# Patient Record
Sex: FEMALE | Race: WHITE | Employment: PART TIME | URBAN - METROPOLITAN AREA
[De-identification: names, ages, dates, MRNs, and addresses within clinical notes are randomized per-mention and may not be internally consistent; named-entity substitution may affect disease eponyms.]

---

## 2018-05-14 ENCOUNTER — APPOINTMENT (OUTPATIENT)
Dept: CT IMAGING | Age: 48
DRG: 300 | End: 2018-05-14
Attending: EMERGENCY MEDICINE
Payer: COMMERCIAL

## 2018-05-14 ENCOUNTER — APPOINTMENT (OUTPATIENT)
Dept: MRI IMAGING | Age: 48
DRG: 300 | End: 2018-05-14
Attending: EMERGENCY MEDICINE
Payer: COMMERCIAL

## 2018-05-14 ENCOUNTER — APPOINTMENT (OUTPATIENT)
Dept: GENERAL RADIOLOGY | Age: 48
DRG: 300 | End: 2018-05-14
Attending: EMERGENCY MEDICINE
Payer: COMMERCIAL

## 2018-05-14 ENCOUNTER — HOSPITAL ENCOUNTER (INPATIENT)
Age: 48
LOS: 2 days | Discharge: HOME | DRG: 300 | End: 2018-05-16
Attending: EMERGENCY MEDICINE | Admitting: FAMILY MEDICINE
Payer: COMMERCIAL

## 2018-05-14 DIAGNOSIS — G90.2 HORNER'S SYNDROME: Primary | ICD-10-CM

## 2018-05-14 DIAGNOSIS — I77.71 CAROTID ARTERY DISSECTION (HCC): ICD-10-CM

## 2018-05-14 LAB
ALBUMIN SERPL-MCNC: 4.4 G/DL (ref 3.5–5)
ALBUMIN/GLOB SERPL: 1.1 {RATIO} (ref 1.1–2.2)
ALP SERPL-CCNC: 80 U/L (ref 45–117)
ALT SERPL-CCNC: 26 U/L (ref 12–78)
ANION GAP SERPL CALC-SCNC: 9 MMOL/L (ref 5–15)
AST SERPL-CCNC: 16 U/L (ref 15–37)
BASOPHILS # BLD: 0 K/UL (ref 0–0.1)
BASOPHILS NFR BLD: 0 % (ref 0–1)
BILIRUB SERPL-MCNC: 0.3 MG/DL (ref 0.2–1)
BUN SERPL-MCNC: 10 MG/DL (ref 6–20)
BUN/CREAT SERPL: 11 (ref 12–20)
CALCIUM SERPL-MCNC: 8.4 MG/DL (ref 8.5–10.1)
CHLORIDE SERPL-SCNC: 107 MMOL/L (ref 97–108)
CO2 SERPL-SCNC: 26 MMOL/L (ref 21–32)
CREAT SERPL-MCNC: 0.89 MG/DL (ref 0.55–1.02)
DIFFERENTIAL METHOD BLD: ABNORMAL
EOSINOPHIL # BLD: 0.1 K/UL (ref 0–0.4)
EOSINOPHIL NFR BLD: 1 % (ref 0–7)
ERYTHROCYTE [DISTWIDTH] IN BLOOD BY AUTOMATED COUNT: 13.1 % (ref 11.5–14.5)
ERYTHROCYTE [SEDIMENTATION RATE] IN BLOOD: 24 MM/HR (ref 0–20)
GLOBULIN SER CALC-MCNC: 4.1 G/DL (ref 2–4)
GLUCOSE BLD STRIP.AUTO-MCNC: 97 MG/DL (ref 65–100)
GLUCOSE SERPL-MCNC: 111 MG/DL (ref 65–100)
HCT VFR BLD AUTO: 41.1 % (ref 35–47)
HGB BLD-MCNC: 13.2 G/DL (ref 11.5–16)
IMM GRANULOCYTES # BLD: 0.1 K/UL (ref 0–0.04)
IMM GRANULOCYTES NFR BLD AUTO: 1 % (ref 0–0.5)
LYMPHOCYTES # BLD: 2.6 K/UL (ref 0.8–3.5)
LYMPHOCYTES NFR BLD: 28 % (ref 12–49)
MCH RBC QN AUTO: 29.3 PG (ref 26–34)
MCHC RBC AUTO-ENTMCNC: 32.1 G/DL (ref 30–36.5)
MCV RBC AUTO: 91.1 FL (ref 80–99)
MONOCYTES # BLD: 0.4 K/UL (ref 0–1)
MONOCYTES NFR BLD: 5 % (ref 5–13)
NEUTS SEG # BLD: 6.1 K/UL (ref 1.8–8)
NEUTS SEG NFR BLD: 66 % (ref 32–75)
NRBC # BLD: 0 K/UL (ref 0–0.01)
NRBC BLD-RTO: 0 PER 100 WBC
PLATELET # BLD AUTO: 308 K/UL (ref 150–400)
PMV BLD AUTO: 8.9 FL (ref 8.9–12.9)
POTASSIUM SERPL-SCNC: 3.4 MMOL/L (ref 3.5–5.1)
PROT SERPL-MCNC: 8.5 G/DL (ref 6.4–8.2)
RBC # BLD AUTO: 4.51 M/UL (ref 3.8–5.2)
SERVICE CMNT-IMP: NORMAL
SODIUM SERPL-SCNC: 142 MMOL/L (ref 136–145)
TROPONIN I SERPL-MCNC: <0.04 NG/ML
WBC # BLD AUTO: 9.3 K/UL (ref 3.6–11)

## 2018-05-14 PROCEDURE — 74011250636 HC RX REV CODE- 250/636: Performed by: FAMILY MEDICINE

## 2018-05-14 PROCEDURE — 85025 COMPLETE CBC W/AUTO DIFF WBC: CPT | Performed by: EMERGENCY MEDICINE

## 2018-05-14 PROCEDURE — 99283 EMERGENCY DEPT VISIT LOW MDM: CPT

## 2018-05-14 PROCEDURE — 85652 RBC SED RATE AUTOMATED: CPT | Performed by: EMERGENCY MEDICINE

## 2018-05-14 PROCEDURE — 70450 CT HEAD/BRAIN W/O DYE: CPT

## 2018-05-14 PROCEDURE — 36415 COLL VENOUS BLD VENIPUNCTURE: CPT | Performed by: FAMILY MEDICINE

## 2018-05-14 PROCEDURE — 84484 ASSAY OF TROPONIN QUANT: CPT | Performed by: FAMILY MEDICINE

## 2018-05-14 PROCEDURE — 74011250637 HC RX REV CODE- 250/637: Performed by: FAMILY MEDICINE

## 2018-05-14 PROCEDURE — 82962 GLUCOSE BLOOD TEST: CPT

## 2018-05-14 PROCEDURE — 71046 X-RAY EXAM CHEST 2 VIEWS: CPT

## 2018-05-14 PROCEDURE — 74011250636 HC RX REV CODE- 250/636: Performed by: EMERGENCY MEDICINE

## 2018-05-14 PROCEDURE — 80053 COMPREHEN METABOLIC PANEL: CPT | Performed by: EMERGENCY MEDICINE

## 2018-05-14 PROCEDURE — 65660000000 HC RM CCU STEPDOWN

## 2018-05-14 PROCEDURE — 93306 TTE W/DOPPLER COMPLETE: CPT | Performed by: FAMILY MEDICINE

## 2018-05-14 PROCEDURE — 74011000250 HC RX REV CODE- 250: Performed by: PHYSICIAN ASSISTANT

## 2018-05-14 RX ORDER — TETRACAINE HYDROCHLORIDE 5 MG/ML
1 SOLUTION OPHTHALMIC
Status: COMPLETED | OUTPATIENT
Start: 2018-05-14 | End: 2018-05-14

## 2018-05-14 RX ORDER — IBUPROFEN 400 MG/1
600 TABLET ORAL DAILY
COMMUNITY

## 2018-05-14 RX ORDER — ALPRAZOLAM 0.5 MG/1
0.5 TABLET ORAL
Status: DISCONTINUED | OUTPATIENT
Start: 2018-05-14 | End: 2018-05-16 | Stop reason: HOSPADM

## 2018-05-14 RX ORDER — OXYCODONE AND ACETAMINOPHEN 5; 325 MG/1; MG/1
1 TABLET ORAL
Status: DISCONTINUED | OUTPATIENT
Start: 2018-05-14 | End: 2018-05-16 | Stop reason: HOSPADM

## 2018-05-14 RX ORDER — SERTRALINE HYDROCHLORIDE 100 MG/1
100 TABLET, FILM COATED ORAL DAILY
COMMUNITY

## 2018-05-14 RX ORDER — LEVOTHYROXINE SODIUM 150 UG/1
150 TABLET ORAL
Status: DISCONTINUED | OUTPATIENT
Start: 2018-05-15 | End: 2018-05-16 | Stop reason: HOSPADM

## 2018-05-14 RX ORDER — SODIUM CHLORIDE 0.9 % (FLUSH) 0.9 %
5-10 SYRINGE (ML) INJECTION AS NEEDED
Status: DISCONTINUED | OUTPATIENT
Start: 2018-05-14 | End: 2018-05-16 | Stop reason: HOSPADM

## 2018-05-14 RX ORDER — ACETAMINOPHEN 650 MG/1
650 SUPPOSITORY RECTAL
Status: DISCONTINUED | OUTPATIENT
Start: 2018-05-14 | End: 2018-05-16 | Stop reason: HOSPADM

## 2018-05-14 RX ORDER — SODIUM CHLORIDE 9 MG/ML
75 INJECTION, SOLUTION INTRAVENOUS CONTINUOUS
Status: DISPENSED | OUTPATIENT
Start: 2018-05-14 | End: 2018-05-15

## 2018-05-14 RX ORDER — GUAIFENESIN 100 MG/5ML
81 LIQUID (ML) ORAL DAILY
Status: DISCONTINUED | OUTPATIENT
Start: 2018-05-15 | End: 2018-05-15

## 2018-05-14 RX ORDER — ATORVASTATIN CALCIUM 10 MG/1
10 TABLET, FILM COATED ORAL DAILY
Status: DISCONTINUED | OUTPATIENT
Start: 2018-05-15 | End: 2018-05-16 | Stop reason: HOSPADM

## 2018-05-14 RX ORDER — LEVOTHYROXINE SODIUM 150 UG/1
300 TABLET ORAL
COMMUNITY

## 2018-05-14 RX ORDER — ACETAMINOPHEN 325 MG/1
650 TABLET ORAL
Status: ACTIVE | OUTPATIENT
Start: 2018-05-14 | End: 2018-05-15

## 2018-05-14 RX ORDER — FAMOTIDINE 20 MG/1
20 TABLET, FILM COATED ORAL EVERY 12 HOURS
Status: DISCONTINUED | OUTPATIENT
Start: 2018-05-14 | End: 2018-05-16 | Stop reason: HOSPADM

## 2018-05-14 RX ORDER — SODIUM CHLORIDE 0.9 % (FLUSH) 0.9 %
5-10 SYRINGE (ML) INJECTION EVERY 8 HOURS
Status: DISCONTINUED | OUTPATIENT
Start: 2018-05-14 | End: 2018-05-16 | Stop reason: HOSPADM

## 2018-05-14 RX ORDER — SERTRALINE HYDROCHLORIDE 50 MG/1
100 TABLET, FILM COATED ORAL DAILY
Status: DISCONTINUED | OUTPATIENT
Start: 2018-05-15 | End: 2018-05-16 | Stop reason: HOSPADM

## 2018-05-14 RX ORDER — ATORVASTATIN CALCIUM 10 MG/1
TABLET, FILM COATED ORAL DAILY
COMMUNITY

## 2018-05-14 RX ORDER — ALPRAZOLAM 0.5 MG/1
TABLET ORAL
COMMUNITY

## 2018-05-14 RX ORDER — ACETAMINOPHEN 325 MG/1
650 TABLET ORAL
Status: DISCONTINUED | OUTPATIENT
Start: 2018-05-14 | End: 2018-05-16 | Stop reason: HOSPADM

## 2018-05-14 RX ORDER — POTASSIUM CHLORIDE 750 MG/1
10 TABLET, FILM COATED, EXTENDED RELEASE ORAL
Status: COMPLETED | OUTPATIENT
Start: 2018-05-14 | End: 2018-05-14

## 2018-05-14 RX ADMIN — FLUORESCEIN SODIUM 1 STRIP: 1 STRIP OPHTHALMIC at 12:26

## 2018-05-14 RX ADMIN — SODIUM CHLORIDE 1000 ML: 900 INJECTION, SOLUTION INTRAVENOUS at 13:18

## 2018-05-14 RX ADMIN — FAMOTIDINE 20 MG: 20 TABLET ORAL at 21:21

## 2018-05-14 RX ADMIN — OXYCODONE HYDROCHLORIDE AND ACETAMINOPHEN 1 TABLET: 5; 325 TABLET ORAL at 18:13

## 2018-05-14 RX ADMIN — TETRACAINE HYDROCHLORIDE 1 DROP: 5 SOLUTION OPHTHALMIC at 12:25

## 2018-05-14 RX ADMIN — SODIUM CHLORIDE 75 ML/HR: 900 INJECTION, SOLUTION INTRAVENOUS at 21:20

## 2018-05-14 RX ADMIN — POTASSIUM CHLORIDE 10 MEQ: 750 TABLET, EXTENDED RELEASE ORAL at 18:13

## 2018-05-14 RX ADMIN — OXYCODONE HYDROCHLORIDE AND ACETAMINOPHEN 1 TABLET: 5; 325 TABLET ORAL at 22:27

## 2018-05-14 RX ADMIN — Medication 10 ML: at 21:22

## 2018-05-14 NOTE — IP AVS SNAPSHOT
[facility] [address] 
[phone] Patient: Jt Ibarra MRN: [id number] Jae La About your hospitalization You were admitted on:  May 14, 2018 You last received care in the:  [address] [place] [address] NEURO-SCI TELE You were discharged on:  May 16, 2018 Why you were hospitalized Your primary diagnosis was:  Carotid Artery Dissection (Hcc) Your diagnoses also included:  Lorrie Syndrome Follow-up Information Follow up With Details Comments Contact Info Angel Lazo MD On 5/23/2018 Hospital PCP f/u appointment on Wednesday May 23 @ 2:30 p.m. [facility] [address] [place] [address] 
[phone] Randa Daniels MD In 1 month For follow up and repeat  [facility] [address] [place] [address] 
[phone] Discharge Orders None A check ady indicates which time of day the medication should be taken. My Medications START taking these medications Instructions Each Dose to Equal  
 Morning Noon Evening Bedtime  
 apixaban 5 mg tablet Commonly known as:  Pattricia Boom Your last dose was: Your next dose is: Take 1 Tab by mouth two (2) times a day. Indications: Cerebral Thromboembolism Prevention 5 mg  
    
   
   
   
  
 aspirin 81 mg chewable tablet Start taking on:  5/17/2018 Your last dose was: Your next dose is: Take 1 Tab by mouth daily. 81 mg  
    
   
   
   
  
 famotidine 20 mg tablet Commonly known as:  PEPCID Your last dose was: Your next dose is: Take 1 Tab by mouth every twelve (12) hours. 20 mg  
    
   
   
   
  
 oxyCODONE-acetaminophen 5-325 mg per tablet Commonly known as:  PERCOCET Your last dose was: Your next dose is: Take 1 Tab by mouth every eight (8) hours as needed. Max Daily Amount: 3 Tabs. 1 Tab CONTINUE taking these medications Instructions Each Dose to Equal  
 Morning Noon Evening Bedtime ALPRAZolam 0.5 mg tablet Commonly known as:  Nielstip Negrete Your last dose was: Your next dose is: Take  by mouth two (2) times daily as needed for Anxiety. ibuprofen 400 mg tablet Commonly known as:  MOTRIN Your last dose was: Your next dose is: Take 400 mg by mouth daily. 400 mg  
    
   
   
   
  
 LIPITOR 10 mg tablet Generic drug:  atorvastatin Your last dose was: Your next dose is: Take  by mouth daily. sertraline 100 mg tablet Commonly known as:  ZOLOFT Your last dose was: Your next dose is: Take 100 mg by mouth daily. 100 mg UNITHROID 150 mcg tablet Generic drug:  levothyroxine Your last dose was: Your next dose is: Take  by mouth Daily (before breakfast). Where to Get Your Medications Information on where to get these meds will be given to you by the nurse or doctor. ! Ask your nurse or doctor about these medications  
  apixaban 5 mg tablet  
 aspirin 81 mg chewable tablet  
 famotidine 20 mg tablet  
 oxyCODONE-acetaminophen 5-325 mg per tablet Opioid Education Prescription Opioids: What You Need to Know: 
 
 
ATTENDING PHYSICIAN: Lula Dejesus MD 
DISCHARGING PROVIDER: Lula Dejesus MD   
 To contact this individual call [phone] and ask the  to page. If unavailable ask to be transferred the Adult Hospitalist Department. DISCHARGE DIAGNOSES #. 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 Right carotid artery dissection #. HLD: on statin #. Hypokalemia, replaced CONSULTATIONS: IP CONSULT TO OPHTHALMOLOGY 
IP CONSULT TO NEUROLOGY 
IP CONSULT TO NEUROINTERVENTIONAL SURGERY 
IP CONSULT TO HOSPITALIST 
 
PROCEDURES/SURGERIES: * No surgery found * PENDING TEST RESULTS:  
At the time of discharge the following test results are still pending: none FOLLOW UP APPOINTMENTS:  
Follow-up Information Follow up With Details Comments Contact Info Gladys Duran MD   [facility] [address] [place] [address] 
[phone] ADDITIONAL CARE RECOMMENDATIONS:  Follow up with interventional neurologist Dr Forest Deleon in 1 month DIET: Regular Diet ACTIVITY: No Strenuous exercise, No heavy lifting, pushing, pulling DISCHARGE MEDICATIONS: 
 See Medication Reconciliation Form · It is important that you take the medication exactly as they are prescribed. · Keep your medication in the bottles provided by the pharmacist and keep a list of the medication names, dosages, and times to be taken in your wallet. · Do not take other medications without consulting your doctor. NOTIFY YOUR PHYSICIAN FOR ANY OF THE FOLLOWING:  
Fever over 101 degrees for 24 hours. Chest pain, shortness of breath, fever, chills, nausea, vomiting, diarrhea, change in mentation, falling, weakness, bleeding. Severe pain or pain not relieved by medications. Or, any other signs or symptoms that you may have questions about. DISPOSITION: 
 x Home With: 
 OT  PT  [facility]  RN  
  
 SNF/Inpatient Rehab/LTAC Independent/assisted living Hospice Other:  
 
 
 
Signed:  
Corine Ch MD 
5/16/2018 
2:04 PM 
 
Apixaban (By mouth) Apixaban (a-PIX-a-ban) Treats and prevents blood clots. This medicine is a blood thinner. Brand Name(s): Eliquis There may be other brand names for this medicine. When This Medicine Should Not Be Used: This medicine is not right for everyone. Do not use it if you had an allergic reaction to apixaban or you have active bleeding. How to Use This Medicine:  
Tablet · Your doctor will tell you how much medicine to use. Do not use more than directed. · If you are not able to swallow the tablets whole, they may be crushed and mixed in water, 5% dextrose in water (D5W), apple juice, or applesauce. The crushed tablets may be mixed with 60 mL of water or D5W dose and given through a nasogastric tube (NGT). · This medicine should come with a Medication Guide. Ask your pharmacist for a copy if you do not have one. · Missed dose: Take a dose as soon as you remember. If it is almost time for your next dose, wait until then and take a regular dose. Do not take extra medicine to make up for a missed dose. · Store the medicine in a closed container at room temperature, away from heat, moisture, and direct light. Drugs and Foods to Avoid: Ask your doctor or pharmacist before using any other medicine, including over-the-counter medicines, vitamins, and herbal products. · Some medicines can affect how apixaban works. Tell your doctor if you are using any of the following: ¨ Carbamazepine, clarithromycin, itraconazole, ketoconazole, phenytoin, rifampin, ritonavir, Aide's wort ¨ Blood thinner (including clopidogrel, heparin, prasugrel, warfarin) ¨ Medicine to treat depression ¨ NSAID pain or arthritis medicine (including aspirin, celecoxib, diclofenac, ibuprofen, naproxen) Warnings While Using This Medicine: · Tell your doctor if you are pregnant or breastfeeding, or if you have kidney disease, liver disease, bleeding problems, or an artificial heart valve. · Do not stop using this medicine suddenly without asking your doctor.  You might have a higher risk of stroke for a short time after you stop using this medicine. · This medicine increases your risk for bleeding that can become serious if not controlled. You may also bruise easily, and it may take longer than usual for bleeding to stop. · This medicine may increase your risk for blood clots in your spine or back if you undergo an epidural or spinal puncture. This could lead to paralysis. Tell your doctor if you ever had spine problems or back surgery. · Tell any doctor or dentist who treats you that you are using this medicine. With your doctor's supervision, you may need to stop using this medicine several days before you have surgery or medical tests. · Your doctor will do lab tests at regular visits to check on the effects of this medicine. Keep all appointments. · Keep all medicine out of the reach of children. Never share your medicine with anyone. Possible Side Effects While Using This Medicine:  
Call your doctor right away if you notice any of these side effects: · Allergic reaction: Itching or hives, swelling in your face or hands, swelling or tingling in your mouth or throat, chest tightness, trouble breathing · Change in how much or how often you urinate, red or pink urine · Chest pain, trouble breathing · Coughing up blood, vomiting blood or material that looks like coffee grounds · Numbness, tingling, or muscle weakness in your legs or feet · Red or black, tarry stools · Unusual bleeding, bruising, or weakness If you notice other side effects that you think are caused by this medicine, tell your doctor. Call your doctor for medical advice about side effects. You may report side effects to FDA at 2-657-FDA-8005 © 2017 [address] Information is for End User's use only and may not be sold, redistributed or otherwise used for commercial purposes. The above information is an  only.  It is not intended as medical advice for individual conditions or treatments. Talk to your doctor, nurse or pharmacist before following any medical regimen to see if it is safe and effective for you. Introducing [facility]! [employer] introduces Inquirly patient portal. Now you can access parts of your medical record, email your doctor's office, and request medication refills online. 1. In your internet browser, go to [email or web address] 2. Click on the First Time User? Click Here link in the Sign In box. You will see the New Member Sign Up page. 3. Enter your Inquirly Access Code exactly as it appears below. You will not need to use this code after youve completed the sign-up process. If you do not sign up before the expiration date, you must request a new code. · Inquirly Access Code: [email or web address] Expires: 8/12/2018 11:56 AM 
 
4. Enter the last four digits of your Social Security Number (xxxx) and Date of Birth (mm/dd/yyyy) as indicated and click Submit. You will be taken to the next sign-up page. 5. Create a Inquirly ID. This will be your Inquirly login ID and cannot be changed, so think of one that is secure and easy to remember. 6. Create a Inquirly password. You can change your password at any time. 7. Enter your Password Reset Question and Answer. This can be used at a later time if you forget your password. 8. Enter your e-mail address. You will receive e-mail notification when new information is available in [address] 9. Click Sign Up. You can now view and download portions of your medical record. 10. Click the Download Summary menu link to download a portable copy of your medical information. If you have questions, please visit the Frequently Asked Questions section of the Inquirly website. Remember, Inquirly is NOT to be used for urgent needs. For medical emergencies, dial 911. Now available from your iPhone and Android! Introducing Temo Mcnulty As a [facility] patient, I wanted to make you aware of our electronic visit tool called Temo ArtQuesCom. [facility] 24/7 allows you to connect within minutes with a medical provider 24 hours a day, seven days a week via a mobile device or tablet or logging into a secure website from your computer. You can access Weekdone from anywhere in the [place]. A virtual visit might be right for you when you have a simple condition and feel like you just dont want to get out of bed, or cant get away from work for an appointment, when your regular [facility] provider is not available (evenings, weekends or holidays), or when youre out of town and need minor care. Electronic visits cost only $49 and if the [employer] 24/7 provider determines a prescription is needed to treat your condition, one can be electronically transmitted to a nearby pharmacy*. Please take a moment to enroll today if you have not already done so. The enrollment process is free and takes just a few minutes. To enroll, please download the [facility] 24/7 julia to your tablet or phone, or visit [email or web address] to enroll on your computer. And, as an [address] [place] [address] patient with a [email or web address] account, the results of your visits will be scanned into your electronic medical record and your primary care provider will be able to view the scanned results. We urge you to continue to see your regular [employer] provider for your ongoing medical care. And while your primary care provider may not be the one available when you seek a Temo Mcnulty virtual visit, the peace of mind you get from getting a real diagnosis real time can be priceless. For more information on Temo Treehousechapinfin, view our Frequently Asked Questions (FAQs) at [email or web address]. Sincerely, 
 
Jeovanny Brown MD 
Chief Medical Officer [employer] *:  certain medications cannot be prescribed via Temo Mcnulty Providers Seen During Your Hospitalization Provider Specialty Primary office phone Saulo Villanueva MD Emergency Medicine [phone] Fadumo Aguirre MD Hospitalist [phone] Dora Harrison MD Internal Medicine [phone] Your Primary Care Physician (PCP) Primary Care Physician Office Phone Office Fax  
 Louise torres [phone] You are allergic to the following Allergen Reactions Pcn (Penicillins) Swelling 5/14/18: Swelling, itching (childhood) Recent Documentation Height Weight BMI OB Status Smoking Status 1.676 m 114.5 kg 40.75 kg/m2 IUD Former Smoker Emergency Contacts Name Discharge Info Relation Home Work Mobile [employer] DISCHARGE CAREGIVER [3] Spouse [3]   [phone] Patient Belongings The following personal items are in your possession at time of discharge: 
  Dental Appliances: None  Visual Aid: None      Home Medications: None   Jewelry: Watch, Ring  Clothing: Pajamas    Other Valuables: Cell Phone Please provide this summary of care documentation to your next provider. Signatures-by signing, you are acknowledging that this After Visit Summary has been reviewed with you and you have received a copy. Patient Signature:  ____________________________________________________________ Date:  ____________________________________________________________  
  
Dee Sams Provider Signature:  ____________________________________________________________ Date:  ____________________________________________________________

## 2018-05-14 NOTE — PROGRESS NOTES
Problem: Falls - Risk of  Goal: *Absence of Falls  Document Alfredo Fall Risk and appropriate interventions in the flowsheet.    Outcome: Progressing Towards Goal  Fall Risk Interventions:            Medication Interventions: Evaluate medications/consider consulting pharmacy, Patient to call before getting OOB, Teach patient to arise slowly, Assess postural VS orthostatic hypotension

## 2018-05-14 NOTE — PROGRESS NOTES
Admission Medication Reconciliation:    Information obtained from: Patient, RX Query    Significant PMH/Disease States:   Past Medical History:   Diagnosis Date    Elevated cholesterol     Psychiatric disorder     depression, anxiety       Chief Complaint for this Admission:  Eye problem    Allergies:  Pcn [penicillins]    Prior to Admission Medications:   Prior to Admission Medications   Prescriptions Last Dose Informant Patient Reported? Taking? ALPRAZolam (XANAX) 0.5 mg tablet 5/13/2018 at Unknown time  Yes Yes   Sig: Take  by mouth two (2) times daily as needed for Anxiety. atorvastatin (LIPITOR) 10 mg tablet 5/13/2018 at Unknown time  Yes Yes   Sig: Take  by mouth daily. ibuprofen (MOTRIN) 400 mg tablet 5/13/2018 at Unknown time  Yes Yes   Sig: Take 400 mg by mouth daily. levothyroxine (UNITHROID) 150 mcg tablet 5/13/2018 at Unknown time  Yes Yes   Sig: Take  by mouth Daily (before breakfast). sertraline (ZOLOFT) 100 mg tablet 5/13/2018 at Unknown time  Yes Yes   Sig: Take 100 mg by mouth daily. Facility-Administered Medications: None         Comments/Recommendations: Patient provided information, allergies were confirmed (updated to include \"itching\"). Added all agents. States she takes no other medications (prescription or OTC) other than stated here. Thank you for allowing me to participate in the care of your patient.     Lady Hammond PharmD, RN #[id number]

## 2018-05-14 NOTE — IP AVS SNAPSHOT
[address] [place] [address] 
[phone] Patient: Remi Kirby MRN: [id number] [facility] A check ady indicates which time of day the medication should be taken. My Medications START taking these medications Instructions Each Dose to Equal  
 Morning Noon Evening Bedtime  
 apixaban 5 mg tablet Commonly known as:  Iban Wright Your last dose was: Your next dose is: Take 1 Tab by mouth two (2) times a day. Indications: Cerebral Thromboembolism Prevention 5 mg  
    
   
   
   
  
 aspirin 81 mg chewable tablet Start taking on:  5/17/2018 Your last dose was: Your next dose is: Take 1 Tab by mouth daily. 81 mg  
    
   
   
   
  
 famotidine 20 mg tablet Commonly known as:  PEPCID Your last dose was: Your next dose is: Take 1 Tab by mouth every twelve (12) hours. 20 mg  
    
   
   
   
  
 oxyCODONE-acetaminophen 5-325 mg per tablet Commonly known as:  PERCOCET Your last dose was: Your next dose is: Take 1 Tab by mouth every eight (8) hours as needed. Max Daily Amount: 3 Tabs. 1 Tab CONTINUE taking these medications Instructions Each Dose to Equal  
 Morning Noon Evening Bedtime ALPRAZolam 0.5 mg tablet Commonly known as:  Faisal Vera Your last dose was: Your next dose is: Take  by mouth two (2) times daily as needed for Anxiety. ibuprofen 400 mg tablet Commonly known as:  MOTRIN Your last dose was: Your next dose is: Take 400 mg by mouth daily. 400 mg  
    
   
   
   
  
 LIPITOR 10 mg tablet Generic drug:  atorvastatin Your last dose was: Your next dose is: Take  by mouth daily. sertraline 100 mg tablet Commonly known as:  ZOLOFT  
 Your last dose was: Your next dose is: Take 100 mg by mouth daily. 100 mg UNITHROID 150 mcg tablet Generic drug:  levothyroxine Your last dose was: Your next dose is: Take  by mouth Daily (before breakfast). Where to Get Your Medications Information on where to get these meds will be given to you by the nurse or doctor. ! Ask your nurse or doctor about these medications  
  apixaban 5 mg tablet  
 aspirin 81 mg chewable tablet  
 famotidine 20 mg tablet  
 oxyCODONE-acetaminophen 5-325 mg per tablet

## 2018-05-14 NOTE — ROUTINE PROCESS
TRANSFER - OUT REPORT:    Verbal report given to Ozzy Templeton RN(name) on [address]  being transferred to [facility](unit) for routine progression of care       Report consisted of patients Situation, Background, Assessment and   Recommendations(SBAR). Information from the following report(s) SBAR, ED Summary, Procedure Summary, Intake/Output, MAR and Recent Results was reviewed with the receiving nurse. Lines:       Opportunity for questions and clarification was provided.       Patient transported with:

## 2018-05-14 NOTE — ED NOTES
11:59 AM  I have evaluated the patient as the Provider in Triage. I have reviewed Her vital signs and the triage nurse assessment. I have talked with the patient and any available family and advised that I am the provider in triage and have ordered the appropriate study to initiate their work up based on the clinical presentation during my assessment. I have advised that the patient will be accommodated in the Main ED as soon as possible. I have also requested to contact the triage nurse or myself immediately if the patient experiences any changes in their condition during this brief waiting period. Pt with right eye pain, photophobia. + pain with consensual pupil response. + conjunctival irritation.     GRACIELA Martinez

## 2018-05-14 NOTE — ED PROVIDER NOTES
HPI Comments: 41 y.o. female with past medical history significant for elevated cholesterol who presents, accompanied by , with chief complaint of eye problem. Patient reports waking up this morning and noticing her right eyelid was drooping. Patient also complains of photophobia and pain behind her right eye. Patient's family members noticed her pupils were unequal this morning. Patient has not recently used scopolamine patch. Patient also complains of constant nausea for the past 5 days following a 24-hour period of vomiting and diarrhea. Patient states diarrhea is still constant and she has lack of appetite due to nausea an diarrhea every time she eats. Patient denies eye pain, blurry vision, foreign body sensation, eye irritation, dizziness, gait disturbance, weakness, numbness. There are no other acute medical concerns at this time. Social hx: Former smoker,   PCP: No primary care provider on file. Note written by Shawn Kahn. Michelle Lambert, as dictated by Justin Brito MD 12:34 PM      The history is provided by the patient and the spouse. Past Medical History:   Diagnosis Date    Elevated cholesterol     Psychiatric disorder     depression, anxiety       Past Surgical History:   Procedure Laterality Date    HX CHOLECYSTECTOMY      HX THYROIDECTOMY           History reviewed. No pertinent family history. Social History     Social History    Marital status: N/A     Spouse name: N/A    Number of children: N/A    Years of education: N/A     Occupational History    Not on file. Social History Main Topics    Smoking status: Former Smoker    Smokeless tobacco: Never Used    Alcohol use No    Drug use: No    Sexual activity: Not on file     Other Topics Concern    Not on file     Social History Narrative    No narrative on file         ALLERGIES: Pcn [penicillins]    Review of Systems   Constitutional: Positive for appetite change. Negative for chills and fever. HENT: Negative for rhinorrhea, sore throat and trouble swallowing. Eyes: Positive for photophobia. Negative for pain, redness and visual disturbance. Respiratory: Negative for cough and shortness of breath. Cardiovascular: Negative for chest pain and palpitations. Gastrointestinal: Positive for diarrhea and nausea. Negative for abdominal pain and vomiting. Genitourinary: Negative for dysuria, frequency and hematuria. Musculoskeletal: Negative for arthralgias. Neurological: Positive for headaches. Negative for dizziness, syncope, weakness and numbness. Psychiatric/Behavioral: Negative for behavioral problems. The patient is not nervous/anxious. All other systems reviewed and are negative. Vitals:    05/14/18 1159   BP: (!) 142/103   Pulse: 90   Resp: 18   Temp: 98.1 °F (36.7 °C)   SpO2: 97%   Weight: 113.3 kg (249 lb 12.8 oz)            Physical Exam   Constitutional: She appears well-developed and well-nourished. HENT:   Head: Normocephalic and atraumatic. Mouth/Throat: Oropharynx is clear and moist.   Eyes: EOM are normal.   Ptosis of right eye. Right pupil 3 mm. Left pupil 6 mm. Neck: Normal range of motion. Neck supple. Cardiovascular: Normal rate, regular rhythm, normal heart sounds and intact distal pulses. Exam reveals no gallop and no friction rub. No murmur heard. Pulmonary/Chest: Effort normal. No respiratory distress. She has no wheezes. She has no rales. Abdominal: Soft. There is no tenderness. There is no rebound. Musculoskeletal: Normal range of motion. She exhibits no tenderness. Neurological: She is alert. No cranial nerve deficit. Motor; symmetric   Skin: No erythema. Psychiatric: She has a normal mood and affect. Her behavior is normal.   Nursing note and vitals reviewed. Note written by Marco A Mendoza.  Abhi Liang, as dictated by Mike Garber MD 12:38 PM       [facility]      ED Course       Procedures         CONSULT NOTE:  Spoke to Dr Nguyen Callahan concerning the patient. The patient's history, presentation, physical findings, and results were all discussed. 3:09 PM      CONSULT NOTE:  3:39 PM Kimi Garcia MD spoke with Dr. Andi Singleton, Consult for Neurology. Discussed available diagnostic tests and clinical findings. Dr. Andi Singleton agrees with admission and recommends MRI of brain and MRA of head and neck. She will see patient in consult. 3:42 PM  Dr. Filipe Matthews will admit patient.

## 2018-05-14 NOTE — ED TRIAGE NOTES
Pt complains of R eye swelling and noted R pupil different size then the L. Also complains of headache. Denies altered vision. Complains of light sensitivity.

## 2018-05-14 NOTE — H&P
[address]   HISTORY AND PHYSICAL      Nate Flores  MR#: [id number]  : 1970  ACCOUNT #: [de-identified]   ADMIT DATE: 2018    CHIEF COMPLAINT:  Ptosis. HISTORY OF PRESENT ILLNESS:  The patient is a 72-year-old female with past medical history of migraines and hyperlipidemia who presents to the hospital with the above-mentioned symptoms. The patient reports that she was doing well until yesterday, woke up this morning and noticed that her right eyelid was \"drooping\". Patient reports that she also has some photophobia just on the right eye and pain behind her right eye. Patient reports that her family members noticed her pupils were unequal this morning and she is absolutely sure that they were absolutely fine yesterday because she looked at them in the mirror. Patient reports that about a week back, she started having some nausea and then had diarrhea for three days. The patient reports that her diarrhea and nausea/vomiting has resolved, but her appetite has not gained back as, \"it should\". The patient was seen in the ER and was requested to be admitted under the hospitalist service. The patient denies any trauma to the eye. Denies any balance issues. Denies any headache, blurry vision, sore throat, trouble swallowing, trouble with speech. Denies any chest pain, shortness of breath, cough, fever, chills, abdominal pain, constipation, diarrhea, urinary symptoms, focal or generalized neurological weakness, recent travels, sick contacts, any falls, injuries, hematemesis, melena, hemoptysis or any other concerns or problems. PAST MEDICAL HISTORY:  See above. HOME MEDICATIONS:  Xanax 0.5 mg b.i.d. as needed, Synthroid 150 mcg daily, Zoloft 100 mg daily, Lipitor 10 mg daily, ibuprofen 400 mg. SOCIAL HISTORY:  Former smoker, no alcohol, no IV drug abuse. ALLERGIES:  PENICILLIN.     REVIEW OF SYSTEMS:  All systems were reviewed and were found to be essentially negative except for the symptoms mentioned above. FAMILY HISTORY:  Was discussed, was found to be noncontributory. PHYSICAL EXAMINATION:  VITAL SIGNS:  Temperature 98.1, pulse 85, respiration rate 18, blood pressure 145/89, pulse ox and 98% on room air. GENERAL:  Alert x2, awake, no acute distress, resting in bed, pleasant female, appears to be stated age. HEENT:  Right pupil is about 1.5-2 mm in diameter and mildly sluggish in response. The left pupil is normal size, equal and reactive. The right eye shows ptosis. Tympanic membranes clear. Moist mucous membranes. NECK:  Supple. No meningeal signs. CHEST:  Clear to auscultation bilaterally. HEART:  S1, S2 were heard. ABDOMEN:  Soft, nontender, nondistended. Bowel sounds are physiologic. EXTREMITIES:  No clubbing, no cyanosis, no edema. NEUROPSYCHIATRIC:  Pleasant mood and affect. Cranial nerves II-XII grossly intact except for ptosis of the right eye as well as constricted pupil. Moves all 4. Strength 5 x 5. Sensory grossly within normal limits. SKIN:  Warm. LABORATORY DATA:  White count 9.3, hemoglobin 13.2, hematocrit 41.1, platelets 407. Sodium 142, potassium 3.4, chloride 107, bicarbonate 26, anion gap 9, glucose 111, BUN 10, creatinine 0.89, calcium 8.4, bilirubin total 0.3, ALT 26, AST 16, alkaline phosphatase 80. CT of the head shows no acute intracranial abnormality. X-ray of the chest is pending. ASSESSMENT AND PLAN:  1. 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 The patient will be admitted on neuro-tele bed. Neurology has been consulted. MRI of the brain and neck has been ordered. We will provide gentle IV hydration, neurovascular checks. We will get a chest x-ray and may consider getting a CT of the chest to rule out any occult malignancies. We will provide supportive care, pain control and continue to monitor.   I will put the patient on fall precautions and may consider further intervention and diagnostics in discussion Neurology. We will reassess as needed. Continue to monitor. 2.  History of hyperlipidemia. Continue home medication. 3.  Hypokalemia. Potassium was replaced. 4.  GI and deep venous thrombosis prophylaxis. The patient will be on SCDs.       Rossy Cota MD MM/MN  D: 05/14/2018 16:53     T: 05/14/2018 17:21  JOB #: [id number]

## 2018-05-15 ENCOUNTER — APPOINTMENT (OUTPATIENT)
Dept: MRI IMAGING | Age: 48
DRG: 300 | End: 2018-05-15
Attending: EMERGENCY MEDICINE
Payer: COMMERCIAL

## 2018-05-15 ENCOUNTER — APPOINTMENT (OUTPATIENT)
Dept: CT IMAGING | Age: 48
DRG: 300 | End: 2018-05-15
Attending: RADIOLOGY
Payer: COMMERCIAL

## 2018-05-15 PROBLEM — I77.71 CAROTID ARTERY DISSECTION (HCC): Status: ACTIVE | Noted: 2018-05-15

## 2018-05-15 LAB
CHOLEST SERPL-MCNC: 180 MG/DL
ERYTHROCYTE [DISTWIDTH] IN BLOOD BY AUTOMATED COUNT: 13.2 % (ref 11.5–14.5)
EST. AVERAGE GLUCOSE BLD GHB EST-MCNC: 117 MG/DL
HBA1C MFR BLD: 5.7 % (ref 4.2–6.3)
HCT VFR BLD AUTO: 39.1 % (ref 35–47)
HDLC SERPL-MCNC: 39 MG/DL
HDLC SERPL: 4.6 {RATIO} (ref 0–5)
HGB BLD-MCNC: 12.5 G/DL (ref 11.5–16)
LDLC SERPL CALC-MCNC: 112 MG/DL (ref 0–100)
LIPID PROFILE,FLP: ABNORMAL
MCH RBC QN AUTO: 29.3 PG (ref 26–34)
MCHC RBC AUTO-ENTMCNC: 32 G/DL (ref 30–36.5)
MCV RBC AUTO: 91.6 FL (ref 80–99)
NRBC # BLD: 0 K/UL (ref 0–0.01)
NRBC BLD-RTO: 0 PER 100 WBC
PLATELET # BLD AUTO: 325 K/UL (ref 150–400)
PMV BLD AUTO: 9.2 FL (ref 8.9–12.9)
RBC # BLD AUTO: 4.27 M/UL (ref 3.8–5.2)
TRIGL SERPL-MCNC: 145 MG/DL (ref ?–150)
TROPONIN I SERPL-MCNC: <0.04 NG/ML
VLDLC SERPL CALC-MCNC: 29 MG/DL
WBC # BLD AUTO: 9.1 K/UL (ref 3.6–11)

## 2018-05-15 PROCEDURE — 74011250637 HC RX REV CODE- 250/637: Performed by: RADIOLOGY

## 2018-05-15 PROCEDURE — A9585 GADOBUTROL INJECTION: HCPCS | Performed by: INTERNAL MEDICINE

## 2018-05-15 PROCEDURE — 85027 COMPLETE CBC AUTOMATED: CPT | Performed by: FAMILY MEDICINE

## 2018-05-15 PROCEDURE — 80061 LIPID PANEL: CPT | Performed by: FAMILY MEDICINE

## 2018-05-15 PROCEDURE — 74011000258 HC RX REV CODE- 258: Performed by: INTERNAL MEDICINE

## 2018-05-15 PROCEDURE — 77030021566 MRA NECK W CONT

## 2018-05-15 PROCEDURE — 65660000000 HC RM CCU STEPDOWN

## 2018-05-15 PROCEDURE — 74011636320 HC RX REV CODE- 636/320: Performed by: INTERNAL MEDICINE

## 2018-05-15 PROCEDURE — 83036 HEMOGLOBIN GLYCOSYLATED A1C: CPT | Performed by: FAMILY MEDICINE

## 2018-05-15 PROCEDURE — 96374 THER/PROPH/DIAG INJ IV PUSH: CPT

## 2018-05-15 PROCEDURE — 74011250636 HC RX REV CODE- 250/636: Performed by: INTERNAL MEDICINE

## 2018-05-15 PROCEDURE — 74011250637 HC RX REV CODE- 250/637: Performed by: FAMILY MEDICINE

## 2018-05-15 PROCEDURE — 70548 MR ANGIOGRAPHY NECK W/DYE: CPT

## 2018-05-15 PROCEDURE — 70553 MRI BRAIN STEM W/O & W/DYE: CPT

## 2018-05-15 PROCEDURE — 70496 CT ANGIOGRAPHY HEAD: CPT

## 2018-05-15 PROCEDURE — 70544 MR ANGIOGRAPHY HEAD W/O DYE: CPT

## 2018-05-15 PROCEDURE — 0042T CT PERF W CBF: CPT

## 2018-05-15 RX ORDER — ASPIRIN 325 MG
650 TABLET ORAL ONCE
Status: COMPLETED | OUTPATIENT
Start: 2018-05-15 | End: 2018-05-15

## 2018-05-15 RX ORDER — ASPIRIN 325 MG
325 TABLET ORAL DAILY
Status: DISCONTINUED | OUTPATIENT
Start: 2018-05-16 | End: 2018-05-15

## 2018-05-15 RX ORDER — GUAIFENESIN 100 MG/5ML
81 LIQUID (ML) ORAL DAILY
Status: DISCONTINUED | OUTPATIENT
Start: 2018-05-16 | End: 2018-05-16 | Stop reason: HOSPADM

## 2018-05-15 RX ORDER — SODIUM CHLORIDE 0.9 % (FLUSH) 0.9 %
10 SYRINGE (ML) INJECTION
Status: COMPLETED | OUTPATIENT
Start: 2018-05-15 | End: 2018-05-15

## 2018-05-15 RX ADMIN — SERTRALINE HYDROCHLORIDE 100 MG: 50 TABLET ORAL at 07:57

## 2018-05-15 RX ADMIN — Medication 10 ML: at 06:16

## 2018-05-15 RX ADMIN — OXYCODONE HYDROCHLORIDE AND ACETAMINOPHEN 1 TABLET: 5; 325 TABLET ORAL at 07:55

## 2018-05-15 RX ADMIN — FAMOTIDINE 20 MG: 20 TABLET ORAL at 07:56

## 2018-05-15 RX ADMIN — SODIUM CHLORIDE 100 ML: 900 INJECTION, SOLUTION INTRAVENOUS at 16:12

## 2018-05-15 RX ADMIN — OXYCODONE HYDROCHLORIDE AND ACETAMINOPHEN 1 TABLET: 5; 325 TABLET ORAL at 12:01

## 2018-05-15 RX ADMIN — OXYCODONE HYDROCHLORIDE AND ACETAMINOPHEN 1 TABLET: 5; 325 TABLET ORAL at 04:39

## 2018-05-15 RX ADMIN — IOPAMIDOL 100 ML: 755 INJECTION, SOLUTION INTRAVENOUS at 16:12

## 2018-05-15 RX ADMIN — ATORVASTATIN CALCIUM 10 MG: 10 TABLET, FILM COATED ORAL at 07:57

## 2018-05-15 RX ADMIN — APIXABAN 5 MG: 5 TABLET, FILM COATED ORAL at 20:28

## 2018-05-15 RX ADMIN — Medication 10 ML: at 14:12

## 2018-05-15 RX ADMIN — OXYCODONE HYDROCHLORIDE AND ACETAMINOPHEN 1 TABLET: 5; 325 TABLET ORAL at 20:24

## 2018-05-15 RX ADMIN — LEVOTHYROXINE SODIUM 150 MCG: 150 TABLET ORAL at 06:34

## 2018-05-15 RX ADMIN — ALPRAZOLAM 0.5 MG: 0.5 TABLET ORAL at 01:06

## 2018-05-15 RX ADMIN — Medication 10 ML: at 16:12

## 2018-05-15 RX ADMIN — Medication 10 ML: at 22:48

## 2018-05-15 RX ADMIN — FAMOTIDINE 20 MG: 20 TABLET ORAL at 20:24

## 2018-05-15 RX ADMIN — ASPIRIN 650 MG: 325 TABLET ORAL at 11:46

## 2018-05-15 RX ADMIN — ALPRAZOLAM 0.5 MG: 0.5 TABLET ORAL at 16:23

## 2018-05-15 RX ADMIN — SODIUM CHLORIDE 100 ML: 900 INJECTION, SOLUTION INTRAVENOUS at 07:00

## 2018-05-15 RX ADMIN — GADOBUTROL 11 ML: 604.72 INJECTION INTRAVENOUS at 07:29

## 2018-05-15 RX ADMIN — ASPIRIN 81 MG 81 MG: 81 TABLET ORAL at 07:57

## 2018-05-15 RX ADMIN — OXYCODONE HYDROCHLORIDE AND ACETAMINOPHEN 1 TABLET: 5; 325 TABLET ORAL at 16:23

## 2018-05-15 RX ADMIN — IOPAMIDOL 40 ML: 755 INJECTION, SOLUTION INTRAVENOUS at 16:11

## 2018-05-15 NOTE — PROGRESS NOTES
Speech pathology  Orders received, chart reviewed. Patient with chief complaint of eye problem. Her eyelid was drooping and photophobia behind R eye. She specifically denied any changes with speech. Patient is on a regular diet. Brain MRI completed this am and negative for acute infarct. Formal speech/swallow evaluation not indicated at this time. Will complete orders. Thanks!   Sathya Johnson M.S. MINI-SLP

## 2018-05-15 NOTE — PROGRESS NOTES
2000 - Assume pt care. A&O X4. Asymptomatic. Stable vital signs. 2100 - Received a call from Dr. Bailey Bryson. RN was notified that pt. Will be treated medically, started Eliquis 5/15 PM, no stent place, cancel NPO order.   Doctor Bailey Bryson will come and talk to pt. 5/16 AM around 8:30-9 AM.

## 2018-05-15 NOTE — PROGRESS NOTES
Bedside shift change report given to Zheng Garcia RN (oncoming nurse) by Yamil Blanc RN (offgoing nurse). Report included the following information SBAR, Kardex, ED Summary, Procedure Summary, Intake/Output, MAR, Accordion, Recent Results, Med Rec Status, Cardiac Rhythm NSR and Alarm Parameters .

## 2018-05-15 NOTE — ACP (ADVANCE CARE PLANNING)
Request by patient, through admission assessment, to assist with advance medical directive. Consulted with patients chart. Explained document to patient, who was present in the room. Pt would like to review information with her  and complete at a later time.      [id number] Rocco Ocampo M.Div, M.S, [place] [address] available at [phone]

## 2018-05-15 NOTE — PROGRESS NOTES
Bedside and Verbal shift change report given to Bryn Shaffer (oncoming nurse) by Abigail Child RN (offgoing nurse). Report included the following information SBAR, Kardex, Intake/Output, MAR, Recent Results and Cardiac Rhythm NSR.

## 2018-05-15 NOTE — PROGRESS NOTES
Physical Therapy note  5/15/18    Order acknowledged and chart reviewed. Note CTA showing ICA dissection, HALEIGH consult pending. Spoke with RN - will hold PT eval at this time and follow up for mobility assessment as appropriate.     Thank you,  Kim Nelson, PT, DPT

## 2018-05-15 NOTE — PROGRESS NOTES
Problem: Falls - Risk of  Goal: *Absence of Falls  Document Alfredo Fall Risk and appropriate interventions in the flowsheet. Outcome: Progressing Towards Goal  Fall Risk Interventions:            Medication Interventions: Assess postural VS orthostatic hypotension, Evaluate medications/consider consulting pharmacy, Patient to call before getting OOB, Teach patient to arise slowly                  Problem: Pressure Injury - Risk of  Goal: *Prevention of pressure injury  Document Alex Scale and appropriate interventions in the flowsheet.    Outcome: Progressing Towards Goal  Pressure Injury Interventions:  Sensory Interventions: Assess changes in LOC, Keep linens dry and wrinkle-free, Float heels, Monitor skin under medical devices, Minimize linen layers, Discuss PT/OT consult with provider, Check visual cues for pain

## 2018-05-15 NOTE — PROGRESS NOTES
Hospitalist Progress Note  Peggy Leary MD  Answering service: [phone] OR [phone] from in house phone        Date of Service:  5/15/2018  NAME:  Dale Langley  :  1970  MRN:  [id number]      Admission Summary:   42 y/o female with PMH significant for HLD, Migraine presented with right ptosis. Yesterday morning when she woke up, she noted her right pupil was smaller than her left and her right eyelid appeared to be drooping, also has severe headache and some photophobia . No focal weakness, numbness, vision/speech deficits. Denies recent head/neck trauma, falls, cervicalgia. Patient reports that about a week back, she started having some nausea/vomiting and then had diarrhea for three days. Head CT on arrival revealed no acute process. Interval history / Subjective:     Patient seen and examined; states she still has headache, otherwise feels well. No weakness/numbness, no double or blurred vision. No N/V, fever/chills. Assessment & Plan:     #. 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 No evidence of acute cerebral infarction.    -Neurology and interventional neurology following  - loading dose given; continue statin  -CTA head/neck pending. #. HLD: on statin  #.  Hypokalemia, replaced    Code status: Full  DVT prophylaxis: SCD    Care Plan discussed with: Patient/Family and Nurse  Disposition: TBD     Hospital Problems  Date Reviewed: 2018          Codes Class Noted POA    * (Principal)Lorrie syndrome ICD-10-CM: G90.2  ICD-9-CM: 337.9  2018 Unknown                Review of Systems:   A comprehensive review of systems was negative except for that written in the HPI. Vital Signs:    Last 24hrs VS reviewed since prior progress note. Most recent are:  Visit Vitals    /84 (BP Patient Position: At rest)    Pulse 76    Temp 98.1 °F (36.7 °C)    Resp 17    Ht 5' 6\" (1.676 m)    Wt 116.3 kg (256 lb 6.4 oz)    SpO2 95%    BMI 41.38 kg/m2       No intake or output data in the 24 hours ending 05/15/18 1159     Physical Examination:             Constitutional:  No acute distress, cooperative, pleasant    ENT:  Right pupil is about 1.5-2 mm in diameter and mildly sluggish in response. The left pupil is normal size, equal and reactive. The right eye shows ptosis. Oral mucous moist, oropharynx benign. Neck supple,    Resp:  CTA bilaterally. No wheezing/rhonchi/rales. No accessory muscle use   CV:  Regular rhythm, normal rate, no murmurs, gallops, rubs    GI:  Soft, non distended, non tender. normoactive bowel sounds, no hepatosplenomegaly     Musculoskeletal:  No edema, warm, 2+ pulses throughout    Neurologic:  Moves all extremities. AAOx3, CN II-XII reviewed     Psych:  Good insight, Not anxious nor agitated. Data Review:    Review and/or order of clinical lab test  Review and/or order of tests in the radiology section of CPT  Review and/or order of tests in the medicine section of CPT      Labs:     Recent Labs      05/15/18   0130  05/14/18   1244   WBC  9.1  9.3   HGB  12.5  13.2   HCT  39.1  41.1   PLT  325  308     Recent Labs      05/14/18   1244   NA  142   K  3.4*   CL  107   CO2  26   BUN  10   CREA  0.89   GLU  111*   CA  8.4*     Recent Labs      05/14/18   1244   SGOT  16   ALT  26   AP  80   TBILI  0.3   TP  8.5*   ALB  4.4   GLOB  4.1*     No results for input(s): INR, PTP, APTT in the last 72 hours. No lab exists for component: INREXT   No results for input(s): FE, TIBC, PSAT, FERR in the last 72 hours. No results found for: FOL, RBCF   No results for input(s): PH, PCO2, PO2 in the last 72 hours.   Recent Labs 05/14/18   1754  05/14/18   0130   TROIQ  <0.04  <0.04     Lab Results   Component Value Date/Time    Cholesterol, total 180 05/15/2018 01:30 AM    HDL Cholesterol 39 05/15/2018 01:30 AM    LDL, calculated 112 (H) 05/15/2018 01:30 AM    Triglyceride 145 05/15/2018 01:30 AM    CHOL/HDL Ratio 4.6 05/15/2018 01:30 AM     Lab Results   Component Value Date/Time    Glucose (POC) 97 05/14/2018 10:25 PM     No results found for: COLOR, APPRN, SPGRU, REFSG, JOSÉ, PROTU, GLUCU, KETU, BILU, UROU, ALINA, LEUKU, GLUKE, EPSU, BACTU, WBCU, RBCU, CASTS, UCRY      Medications Reviewed:     Current Facility-Administered Medications   Medication Dose Route Frequency    sodium chloride 0.9 % bolus infusion 100 mL  100 mL IntraVENous RAD ONCE    ALPRAZolam (XANAX) tablet 0.5 mg  0.5 mg Oral BID PRN    atorvastatin (LIPITOR) tablet 10 mg  10 mg Oral DAILY    levothyroxine (SYNTHROID) tablet 150 mcg  150 mcg Oral ACB    sertraline (ZOLOFT) tablet 100 mg  100 mg Oral DAILY    sodium chloride (NS) flush 5-10 mL  5-10 mL IntraVENous Q8H    sodium chloride (NS) flush 5-10 mL  5-10 mL IntraVENous PRN    acetaminophen (TYLENOL) tablet 650 mg  650 mg Oral Q4H PRN    Or    acetaminophen (TYLENOL) solution 650 mg  650 mg Per NG tube Q4H PRN    Or    acetaminophen (TYLENOL) suppository 650 mg  650 mg Rectal Q4H PRN    0.9% sodium chloride infusion  75 mL/hr IntraVENous CONTINUOUS    famotidine (PEPCID) tablet 20 mg  20 mg Oral Q12H    oxyCODONE-acetaminophen (PERCOCET) 5-325 mg per tablet 1 Tab  1 Tab Oral Q4H PRN     ______________________________________________________________________  EXPECTED LENGTH OF STAY: - - -  ACTUAL LENGTH OF STAY:          1                 Lucius Rinaldi MD

## 2018-05-15 NOTE — PROGRESS NOTES
Occupational Therapy Note:    Order acknowledged and chart reviewed. Note CTA showing ICA dissection, HALEIGH consult pending. Spoke with RN - will hold OT evaluation at this time and follow up as appropriate. Thank you.     Shari Almanzar, OTR/L

## 2018-05-15 NOTE — PROGRESS NOTES
CM met with patient to discuss discharge planning. She came to the ER yesterday due to eyelid drooping with photophobia. Patient denies trauma to her eye, dizziness or blurred vision. She does not smoke, drink alcohol or use drugs. Patient lives with her  and their 2 children ages 15 & 15. She lives in a 2 story home with 2 steps to inside and 13 steps to upstairs bedroom. She last saw her PCP in Dec. 2017. Prescriptions are filled at [facility]. Insurance: [employer]. Patient does not have an AD. I have given her a pamphlet and paperwork and she will ask us to call [facility] if she wishes to have papers completed. Reason for Admission:   Lorrie's syndrome                   RRAT Score:  5                   Plan for utilizing home health:   TBD                       Likelihood of Readmission:  Low                         Transition of Care Plan: TBD    Care Management Interventions  PCP Verified by CM: Yes (Dr. Donita Drummond)  Last Visit to PCP: 12/15/17  Mode of Transport at Discharge:  Other (see comment) (Car)  Transition of Care Consult (CM Consult): Discharge Planning  MyChart Signup: No  Discharge Durable Medical Equipment: No  Physical Therapy Consult: Yes  Occupational Therapy Consult: Yes  Speech Therapy Consult: Yes  Current Support Network: Lives with Spouse (Spouse and daughters ages 13&14)  Confirm Follow Up Transport: Family  Plan discussed with Pt/Family/Caregiver: Yes (Patient)  Discharge Location  Discharge Placement:  (TBD)     Domingo Blas RN CRM

## 2018-05-15 NOTE — INTERDISCIPLINARY ROUNDS
IDR/SLIDR Summary          Patient: Yaa Louis MRN: [id number]    Age: 41 y.o. YOB: 1970 Room/Bed: [facility]   Admit Diagnosis: Lorrie syndrome  Principal Diagnosis: Lorrie syndrome   Goals: Neuro IR; Medically mgt  Readmission: NO  Quality Measure: Not applicable  VTE Prophylaxis: Mechanical and Chemical  Influenza Vaccine screening completed? NO  Pneumococcal Vaccine screening completed? YES  Mobility needs: No   Nutrition plan:No  Consults:P.T, O.T. and Speech    Financial concerns:No  Escalated to CM? YES  RRAT Score: 5   Interventions:  Testing due for pt today?  NO  LOS: 1 days Expected length of stay 1-2 days  Discharge plan: home   PCP: Phyllis Will MD  Transportation needs: No    Days before discharge:one day until discharge   Discharge disposition: Home    Claudine Silver RN  5/16  745 AM

## 2018-05-15 NOTE — CONSULTS
Neurointerventional Surgery Consult    Patient: Raj Morse MRN: [id number]  SSN: [id number]    YOB: 1970  Age: 41 y.o. Sex: female      Subjective:      Raj Morse is a 41 y.o. female with a history of migraine that experienced a severe, atypical headache approximately one week ago associated with nausea and vomiting. The next morning she noticed aching neck pain on the LEFT side that she attributed muscle strain from Jona". Yesterday the patient awakened with significantly worsened headache and noticed right eyelid droop and asymmetric pupils (left smaller than right). MRI/MRA in the ED demonstrated dissection of the distal right internal carotid artery extending into the skull base (petrous segment). No acute infarct was visible. I am consulted for management of the right carotid artery dissection. At my request, the patient was loaded with 650 mg ASA earlier today. She denies vision changes or focal neurological symptoms currently. Past Medical History:   Diagnosis Date    Elevated cholesterol     Psychiatric disorder     depression, anxiety     Past Surgical History:   Procedure Laterality Date    HX CHOLECYSTECTOMY      HX THYROIDECTOMY        History reviewed. No pertinent family history.   Social History   Substance Use Topics    Smoking status: Former Smoker    Smokeless tobacco: Never Used    Alcohol use No      Current Facility-Administered Medications   Medication Dose Route Frequency Provider Last Rate Last Dose    sodium chloride 0.9 % bolus infusion 100 mL  100 mL IntraVENous RAD ONCE Lucius Hebert MD        sodium chloride 0.9 % bolus infusion 100 mL  100 mL IntraVENous RAD ONCE Lucius Hebert MD        iopamidol (ISOVUE-370) 76 % injection 100 mL  100 mL IntraVENous RAD ONCE Lucius Hebert MD        sodium chloride (NS) flush 10 mL  10 mL IntraVENous RAD ONCE MD Ching Matthews) tablet 0.5 mg 0.5 mg Oral BID PRN Maggie Wallace MD   0.5 mg at 05/15/18 0106    atorvastatin (LIPITOR) tablet 10 mg  10 mg Oral DAILY Maggie Wallace MD   10 mg at 05/15/18 0757    levothyroxine (SYNTHROID) tablet 150 mcg  150 mcg Oral ACB Maggie Wallace MD   150 mcg at 05/15/18 0634    sertraline (ZOLOFT) tablet 100 mg  100 mg Oral DAILY Maggie Wallace MD   100 mg at 05/15/18 0757    sodium chloride (NS) flush 5-10 mL  5-10 mL IntraVENous Q8H Maggie Wallace MD   10 mL at 05/15/18 1412    sodium chloride (NS) flush 5-10 mL  5-10 mL IntraVENous PRN Maggie Wallace MD        acetaminophen (TYLENOL) tablet 650 mg  650 mg Oral Q4H PRN Maggie Wallace MD        Or   Bijan Dunlap acetaminophen (TYLENOL) solution 650 mg  650 mg Per NG tube Q4H PRN Maggie Wallace MD        Or   Bijan Dunlap acetaminophen (TYLENOL) suppository 650 mg  650 mg Rectal Q4H PRN Maggie Wallace MD        0.9% sodium chloride infusion  75 mL/hr IntraVENous CONTINUOUS Maggie Wallace MD 75 mL/hr at 05/14/18 2120 75 mL/hr at 05/14/18 2120    famotidine (PEPCID) tablet 20 mg  20 mg Oral Q12H Maggie Wallace MD   20 mg at 05/15/18 0756    oxyCODONE-acetaminophen (PERCOCET) 5-325 mg per tablet 1 Tab  1 Tab Oral Q4H PRN Maggie Wallace MD   1 Tab at 05/15/18 1201        Allergies   Allergen Reactions    Pcn [Penicillins] Swelling     5/14/18: Swelling, itching (childhood)       Review of Systems:  A comprehensive review of systems was negative except for that written in the History of Present Illness. Objective:     Vitals:    05/15/18 0635 05/15/18 1130 05/15/18 1411 05/15/18 1507   BP: 133/88 120/84 (!) 129/94 (!) 133/93   Pulse: 67 76 74 71   Resp: 16 17 15 13   Temp: 97.6 °F (36.4 °C) 98.1 °F (36.7 °C)  98.2 °F (36.8 °C)   SpO2: 96% 95%     Weight:       Height:              Neurologic Exam:    NIHSS = 1 (decreased sensation in the left lower face to pinprick)    Mental Status:  Alert and oriented x 4. Appropriate affect, mood and behavior.        Language:    Normal fluency, repetition, comprehension and naming. Cranial Nerves:   Lorrie's - right pupil 1 mm, left pupil 3 mm and reactive     Minimal right ptosis     Visual fields full to confrontation. Extraocular movements intact. Left lower face sensation decreased to pinprick. Facial sensation otherwise    intact V1 - V3. Full facial strength, no asymmetry. Hearing intact bilaterally. No dysarthria. Tongue protrudes to midline, palate elevates symmetrically. Shoulder shrug 5/5 bilaterally. Motor:    No pronator drift. Bulk and tone normal.      5/5 power in all extremities proximally and distally. No involuntary movements. Sensation:    Sensation normal except for left lower face, decreased to pinprick. No sensory    extinction. Coordination & Gait: No UE/LE ataxia. Gait deferred. Imaging:  Per HPI. Reviewed. CTA pending. 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 The most common etiology of spontaneous dissection in this age group is fibromuscular dysplasia. There is no history of blunt trauma but she did experience severe heaving strains with vomiting earlier this week. Left lower facial sensory deficit not previously documented, possibly new or just unrecognized previously. No infarct on MRI earlier. CTA + perfusion pending for further characterization. Stent vs conservative management. Stent would only be considered for complications (flow-limitation or enlarging pseudoaneurysm). NPO after MN in case angio is needed. Continue  po qday for stroke prophylaxis.   Will check ARU tonight after 650 mg load earlier today. Would add heparin only if CTA shows intraluminal thrombus or definite flow limitation on CTP. Thank you for this consult and participating in the care of this patient. I have discussed the diagnosis with the patient and the intended plan as seen in the above orders. Patient is in agreement.     Suze Mensah MD  [facility]  [phone]      Signed By: Randa Daniels MD     May 15, 2018

## 2018-05-15 NOTE — CONSULTS
NEUROLOGY CONSULT NOTE    Name Ole Parra Age 41 y.o. 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 No evidence of acute cerebral infarction. Advise continuation of antiplatelet therapy. Recommendations:   CTA H/N pending  Cont. ASA, statin therapy  Will f/u imaging once completed    Thank you very much for this referral. I appreciate the opportunity to participate in this patient's care. History of Present Illness: This is a 41 y.o.  right handed  female, we were asked to see for R ptosis. PMH notable for HPL, migraines. Pt reports she awoke 20 and noted that her right pupil was smaller than her left. Her right eyelid also appeared to be drooping. She also noted a rather significant headache. No focal weakness, numbness, vision/speech deficits. She reports onset of nausea and severe vomiting over the course of 1 week. Denies recent head/neck trauma, falls, cervicalgia. Head CT on arrival revealed no acute process. MRI Brain/MRA subsequent completed showing R ICA dissection (cervical w/extension into the petrous/cavernous/suracloinoid ICA). No associated acute ischemia or thrombus. She is maintained on ASA since admission. Sx have remained stable. Allergies   Allergen Reactions    Pcn [Penicillins] Swelling     20: Swelling, itching (childhood)        Prior to Admission medications    Medication Sig Start Date End Date Taking? Authorizing Provider   ALPRAZolam Cleda Conway) 0.5 mg tablet Take  by mouth two (2) times daily as needed for Anxiety. Yes Historical Provider   levothyroxine (UNITHROID) 150 mcg tablet Take  by mouth Daily (before breakfast). Yes Historical Provider   sertraline (ZOLOFT) 100 mg tablet Take 100 mg by mouth daily. Yes Historical Provider   atorvastatin (LIPITOR) 10 mg tablet Take  by mouth daily. Yes Historical Provider   ibuprofen (MOTRIN) 400 mg tablet Take 400 mg by mouth daily. Yes Historical Provider       Past Medical History:   Diagnosis Date    Elevated cholesterol     Psychiatric disorder     depression, anxiety        Past Surgical History:   Procedure Laterality Date    HX CHOLECYSTECTOMY      HX THYROIDECTOMY          Social History   Substance Use Topics    Smoking status: Former Smoker    Smokeless tobacco: Never Used    Alcohol use No        History reviewed. No pertinent family history. Review of Systems:   Comprehensive review of systems performed and negative except for as listed above. Exam:     Visit Vitals    BP (!) 129/94    Pulse 74    Temp 98.1 °F (36.7 °C)    Resp 15    Ht 5' 6\" (1.676 m)    Wt 116.3 kg (256 lb 6.4 oz)    SpO2 95%    BMI 41.38 kg/m2        General: Well developed, well nourished. Patient in no apparent distress   Head: Normocephalic, atraumatic, anicteric sclera   Lungs:  Clear to auscultation bilaterally, No wheezes or rubs   Cardiac: Regular rate and rhythm with no murmurs. Abd: Bowel sounds were audible. No tenderness on palpation   Ext: No pedal edema   Skin: No overt signs of rash     Neurological Exam:  Mental Status: Alert and oriented to person place and time   Speech: Fluent no aphasia or dysarthria. Cranial Nerves:   Intact visual fields. Facial sensation is normal. Facial movement is symmetric. Palate is midline. Normal sternocleidomastoid strength. Tongue is midline. Hearing is intact bilaterally. Eyes: Pupils OD pinpoint, OS 1.5mm reactive, EOM's full, no nystagmus, + R mild to moderate ptosis.    Motor:  Full and symmetric strength of upper and lower proximal and distal muscles. Normal bulk and tone. Reflexes:   Deep tendon reflexes 1+/4 and symmetrical.  Plantar response is downgoing b/l. Sensory:   Symmetrically intact  with no perceived deficits modalities involving small or large fibers. Gait:  Gait is deferred   Tremor:   No tremor noted. Cerebellar:  Finger to nose and heel over shin to knee was demonstrated competently. Neurovascular: No carotid bruits       Imaging  CT Results (maximum last 3): Results from [place] encounter on 05/14/18   CT HEAD WO CONT   Narrative EXAM:  CT HEAD WO CONT    INDICATION: Right eye ptosis and small pupil. COMPARISON: None    TECHNIQUE: Noncontrast head CT. Coronal and sagittal reformats. CT dose  reduction was achieved through use of a standardized protocol tailored for this  examination and automatic exposure control for dose modulation. Adaptive  statistical iterative reconstruction (ASIR) was utilized. FINDINGS: The ventricles and sulci are age-appropriate without hydrocephalus. There is no mass effect or midline shift. There is no intracranial hemorrhage or  extra-axial fluid collection. There is no abnormal parenchymal attenuation. The  gray-white matter differentiation is maintained. The basal cisterns are patent. The osseous structures are intact. The visualized paranasal sinuses and mastoid  air cells are clear. Impression IMPRESSION:     There is no acute intracranial abnormality. MRI Results (maximum last 3): Results from [place] encounter on 05/14/18   MRI BRAIN W WO CONT   Narrative CLINICAL HISTORY: Lorrie's syndrome    INDICATION: Lorrie's  syndrome. COMPARISON: None  TECHNIQUE: MR examination of the brain includes axial and sagittal T1 , axial  T2, axial FLAIR, axial gradient echo, axial DWI, coronal T2.     Contrast: The patient was administered gadolinium-based contrast material ,  axial and sagittal T1-weighted postcontrast enhanced imaging was obtained. Next, 3-D time-of-flight MRA of the brain was performed. Multiplanar  reconstructions were obtained. Next, Contrast enhanced 2-D time-of-flight MRA of the neck was performed. Multiplanar reconstructions were obtained. FINDINGS:         Right ICA dissection with minimal irregularity of the right internal carotid  artery and minimal irregularity of the left internal carotid artery as well. Dissection begins in the mid cervical portion of the internal carotid artery. Vertebral artery origins are within normal limits. Common carotids are within  normal limits. Left internal carotid artery is widely patent. . The bilateral  subclavian, common carotid, arteries are patent with no flow-limiting stenosis. Continued dissection of the right internal carotid artery. Significant luminal  narrowing of the distal cervical and proximal petrous portions of the ICA on the  right. Diminished flow void in the distal petrous and cavernous ICAs on the  right. Patent A1 segments on the right and on the left. Inflow into the middle  cerebral artery on the right and on the left is grossly within normal limits. There are A1 segments bilaterally. Azygous origin of the A2 segments. Symmetric  arborization of M2 vessels. M1 segments are within normal limits. There is no  aneurysm. There is a small posterior indicating artery on the left. P1 and P2  segments are patent. There is no intracranial mass, hemorrhage or evidence of  acute infarction. There is no abnormal parenchymal enhancement. There is no abnormal meningeal  enhancement. There is no Chiari or syrinx. The pituitary and infundibulum are  grossly unremarkable. There is no skull base mass. Cerebellopontine angles are  grossly unremarkable. The major intracranial vascular flow-voids are  unremarkable. The cavernous sinuses are symmetric. Optic chiasm and infundibulum grossly  unremarkable. Orbits are grossly symmetric.  Dural venous sinuses are grossly  patent. The brain architecture is normal. There is no evidence of midline shift  or mass-effect. There are no extra-axial fluid collections. The mastoid air  cells and paranasal sinuses are well pneumatized and clear. Impression IMPRESSION:   Right internal carotid artery dissection begins in the mid cervical ICA and  extends to the proximal petrous portion of the ICA with continued diminished  luminal dimension throughout the cavernous and supraclinoid ICA. Inflow into the  right middle cerebral artery via a patent [place]. A1 segments are  patent. There is no associated acute infarction present. Otherwise normal MRA of the head and neck. Otherwise normal MRI of the brain. The findings were called to Joy Palomino, the patient's RN on 5/15/2018 at 8:14 AM by  Dr. Karlie Salazar. [address] [place] [address]   Narrative CLINICAL HISTORY: Lorrie's syndrome    INDICATION: Lorrie's  syndrome. COMPARISON: None  TECHNIQUE: MR examination of the brain includes axial and sagittal T1 , axial  T2, axial FLAIR, axial gradient echo, axial DWI, coronal T2. Contrast: The patient was administered gadolinium-based contrast material ,  axial and sagittal T1-weighted postcontrast enhanced imaging was obtained. Next, 3-D time-of-flight MRA of the brain was performed. Multiplanar  reconstructions were obtained. Next, Contrast enhanced 2-D time-of-flight MRA of the neck was performed. Multiplanar reconstructions were obtained. FINDINGS:         Right ICA dissection with minimal irregularity of the right internal carotid  artery and minimal irregularity of the left internal carotid artery as well. Dissection begins in the mid cervical portion of the internal carotid artery. Vertebral artery origins are within normal limits. Common carotids are within  normal limits. Left internal carotid artery is widely patent. . The bilateral  subclavian, common carotid, arteries are patent with no flow-limiting stenosis. Continued dissection of the right internal carotid artery. Significant luminal  narrowing of the distal cervical and proximal petrous portions of the ICA on the  right. Diminished flow void in the distal petrous and cavernous ICAs on the  right. Patent A1 segments on the right and on the left. Inflow into the middle  cerebral artery on the right and on the left is grossly within normal limits. There are A1 segments bilaterally. Azygous origin of the A2 segments. Symmetric  arborization of M2 vessels. M1 segments are within normal limits. There is no  aneurysm. There is a small posterior indicating artery on the left. P1 and P2  segments are patent. There is no intracranial mass, hemorrhage or evidence of  acute infarction. There is no abnormal parenchymal enhancement. There is no abnormal meningeal  enhancement. There is no Chiari or syrinx. The pituitary and infundibulum are  grossly unremarkable. There is no skull base mass. Cerebellopontine angles are  grossly unremarkable. The major intracranial vascular flow-voids are  unremarkable. The cavernous sinuses are symmetric. Optic chiasm and infundibulum grossly  unremarkable. Orbits are grossly symmetric. Dural venous sinuses are grossly  patent. The brain architecture is normal. There is no evidence of midline shift  or mass-effect. There are no extra-axial fluid collections. The mastoid air  cells and paranasal sinuses are well pneumatized and clear. Impression IMPRESSION:   Right internal carotid artery dissection begins in the mid cervical ICA and  extends to the proximal petrous portion of the ICA with continued diminished  luminal dimension throughout the cavernous and supraclinoid ICA. Inflow into the  right middle cerebral artery via a patent [place]. A1 segments are  patent. There is no associated acute infarction present. Otherwise normal MRA of the head and neck.   Otherwise normal MRI of the brain.    The findings were called to Ela Monzon, the patient's RN on 5/15/2018 at 8:14 AM by  Dr. Dale Estevez [address]   Narrative CLINICAL HISTORY: Lorrie's syndrome    INDICATION: Lorrie's  syndrome. COMPARISON: None  TECHNIQUE: MR examination of the brain includes axial and sagittal T1 , axial  T2, axial FLAIR, axial gradient echo, axial DWI, coronal T2. Contrast: The patient was administered gadolinium-based contrast material ,  axial and sagittal T1-weighted postcontrast enhanced imaging was obtained. Next, 3-D time-of-flight MRA of the brain was performed. Multiplanar  reconstructions were obtained. Next, Contrast enhanced 2-D time-of-flight MRA of the neck was performed. Multiplanar reconstructions were obtained. FINDINGS:         Right ICA dissection with minimal irregularity of the right internal carotid  artery and minimal irregularity of the left internal carotid artery as well. Dissection begins in the mid cervical portion of the internal carotid artery. Vertebral artery origins are within normal limits. Common carotids are within  normal limits. Left internal carotid artery is widely patent. . The bilateral  subclavian, common carotid, arteries are patent with no flow-limiting stenosis. Continued dissection of the right internal carotid artery. Significant luminal  narrowing of the distal cervical and proximal petrous portions of the ICA on the  right. Diminished flow void in the distal petrous and cavernous ICAs on the  right. Patent A1 segments on the right and on the left. Inflow into the middle  cerebral artery on the right and on the left is grossly within normal limits. There are A1 segments bilaterally. Azygous origin of the A2 segments. Symmetric  arborization of M2 vessels. M1 segments are within normal limits. There is no  aneurysm. There is a small posterior indicating artery on the left. P1 and P2  segments are patent.  There is no intracranial mass, hemorrhage or evidence of  acute infarction. There is no abnormal parenchymal enhancement. There is no abnormal meningeal  enhancement. There is no Chiari or syrinx. The pituitary and infundibulum are  grossly unremarkable. There is no skull base mass. Cerebellopontine angles are  grossly unremarkable. The major intracranial vascular flow-voids are  unremarkable. The cavernous sinuses are symmetric. Optic chiasm and infundibulum grossly  unremarkable. Orbits are grossly symmetric. Dural venous sinuses are grossly  patent. The brain architecture is normal. There is no evidence of midline shift  or mass-effect. There are no extra-axial fluid collections. The mastoid air  cells and paranasal sinuses are well pneumatized and clear. Impression IMPRESSION:   Right internal carotid artery dissection begins in the mid cervical ICA and  extends to the proximal petrous portion of the ICA with continued diminished  luminal dimension throughout the cavernous and supraclinoid ICA. Inflow into the  right middle cerebral artery via a patent [place]. A1 segments are  patent. There is no associated acute infarction present. Otherwise normal MRA of the head and neck. Otherwise normal MRI of the brain. The findings were called to Ligia Wellington, the patient's RN on 5/15/2018 at 8:14 AM by  Dr. Lj Schneider. [phone]            Lab Review  Lab Results   Component Value Date/Time    WBC 9.1 05/15/2018 01:30 AM    HCT 39.1 05/15/2018 01:30 AM    HGB 12.5 05/15/2018 01:30 AM    PLATELET 304 58/92/0871 01:30 AM     Lab Results   Component Value Date/Time    Sodium 142 05/14/2018 12:44 PM    Potassium 3.4 (L) 05/14/2018 12:44 PM    Chloride 107 05/14/2018 12:44 PM    CO2 26 05/14/2018 12:44 PM    Glucose 111 (H) 05/14/2018 12:44 PM    BUN 10 05/14/2018 12:44 PM    Creatinine 0.89 05/14/2018 12:44 PM    Calcium 8.4 (L) 05/14/2018 12:44 PM     No components found for: TROPQUANT  No results found for: VALENTINA    Signed:  Lubna Elmore.  Montserrat Mancia, DO  5/15/2018  2:22 PM

## 2018-05-15 NOTE — CDMP QUERY
Patient is noted to have a BMI: 41.38 kg/m 2  Please clarify if this patient is:     => Morbid Obesity  (BMI 40 or greater)  =>Obesity (BMI of 30-39.9)  =>Overweight (BMI 25-29.9)  => Other weight status (specify  status)  => Unable to determine    The [address] [place] [address] has issued a statement indicating that, \"Individuals who are overweight, obese, or morbidly obese are at an increased risk for certain medical conditions when compared to persons of normal weight. Therefore, these conditions are always clinically significant and reportable when documented by the provider. \"    Presentation:  Ht: 5' 6\" (1.676 m)  Wt: 116.3 kg (256 lb 6.4 oz)    Please clarify and document your clinical opinion in the progress notes and discharge summary, including the definitive and or presumptive diagnosis, (suspected or probable), related to the above clinical findings. Please include clinical findings supporting your diagnosis.      Thank you,  Andrez Moreno RN  [phone]

## 2018-05-16 VITALS
DIASTOLIC BLOOD PRESSURE: 76 MMHG | WEIGHT: 252.5 LBS | OXYGEN SATURATION: 97 % | SYSTOLIC BLOOD PRESSURE: 116 MMHG | TEMPERATURE: 98 F | RESPIRATION RATE: 22 BRPM | HEART RATE: 86 BPM | BODY MASS INDEX: 40.58 KG/M2 | HEIGHT: 66 IN

## 2018-05-16 LAB
ANION GAP SERPL CALC-SCNC: 7 MMOL/L (ref 5–15)
APTT PPP: 31.4 SEC (ref 22.1–32)
BUN SERPL-MCNC: 11 MG/DL (ref 6–20)
BUN/CREAT SERPL: 13 (ref 12–20)
CALCIUM SERPL-MCNC: 8.1 MG/DL (ref 8.5–10.1)
CHLORIDE SERPL-SCNC: 104 MMOL/L (ref 97–108)
CO2 SERPL-SCNC: 28 MMOL/L (ref 21–32)
CREAT SERPL-MCNC: 0.88 MG/DL (ref 0.55–1.02)
GLUCOSE SERPL-MCNC: 99 MG/DL (ref 65–100)
INR PPP: 1 (ref 0.9–1.1)
POTASSIUM SERPL-SCNC: 4 MMOL/L (ref 3.5–5.1)
PROTHROMBIN TIME: 10.6 SEC (ref 9–11.1)
SODIUM SERPL-SCNC: 139 MMOL/L (ref 136–145)
THERAPEUTIC RANGE,PTTT: NORMAL SECS (ref 58–77)

## 2018-05-16 PROCEDURE — 36415 COLL VENOUS BLD VENIPUNCTURE: CPT | Performed by: INTERNAL MEDICINE

## 2018-05-16 PROCEDURE — 97165 OT EVAL LOW COMPLEX 30 MIN: CPT

## 2018-05-16 PROCEDURE — 94760 N-INVAS EAR/PLS OXIMETRY 1: CPT

## 2018-05-16 PROCEDURE — 74011250637 HC RX REV CODE- 250/637: Performed by: FAMILY MEDICINE

## 2018-05-16 PROCEDURE — 85730 THROMBOPLASTIN TIME PARTIAL: CPT | Performed by: INTERNAL MEDICINE

## 2018-05-16 PROCEDURE — 74011250637 HC RX REV CODE- 250/637: Performed by: RADIOLOGY

## 2018-05-16 PROCEDURE — 80048 BASIC METABOLIC PNL TOTAL CA: CPT | Performed by: INTERNAL MEDICINE

## 2018-05-16 PROCEDURE — 85610 PROTHROMBIN TIME: CPT | Performed by: INTERNAL MEDICINE

## 2018-05-16 RX ORDER — GUAIFENESIN 100 MG/5ML
81 LIQUID (ML) ORAL DAILY
Qty: 30 TAB | Refills: 0 | Status: SHIPPED | OUTPATIENT
Start: 2018-05-17 | End: 2018-08-20 | Stop reason: ALTCHOICE

## 2018-05-16 RX ORDER — FAMOTIDINE 20 MG/1
20 TABLET, FILM COATED ORAL EVERY 12 HOURS
Qty: 60 TAB | Refills: 0 | Status: SHIPPED | OUTPATIENT
Start: 2018-05-16 | End: 2021-01-21

## 2018-05-16 RX ORDER — OXYCODONE AND ACETAMINOPHEN 5; 325 MG/1; MG/1
1 TABLET ORAL
Qty: 20 TAB | Refills: 0 | Status: SHIPPED | OUTPATIENT
Start: 2018-05-16

## 2018-05-16 RX ADMIN — LEVOTHYROXINE SODIUM 150 MCG: 150 TABLET ORAL at 07:01

## 2018-05-16 RX ADMIN — OXYCODONE HYDROCHLORIDE AND ACETAMINOPHEN 1 TABLET: 5; 325 TABLET ORAL at 00:37

## 2018-05-16 RX ADMIN — ASPIRIN 81 MG 81 MG: 81 TABLET ORAL at 08:00

## 2018-05-16 RX ADMIN — ATORVASTATIN CALCIUM 10 MG: 10 TABLET, FILM COATED ORAL at 08:00

## 2018-05-16 RX ADMIN — APIXABAN 5 MG: 5 TABLET, FILM COATED ORAL at 08:00

## 2018-05-16 RX ADMIN — SERTRALINE HYDROCHLORIDE 100 MG: 50 TABLET ORAL at 08:00

## 2018-05-16 RX ADMIN — OXYCODONE HYDROCHLORIDE AND ACETAMINOPHEN 1 TABLET: 5; 325 TABLET ORAL at 14:29

## 2018-05-16 RX ADMIN — Medication 10 ML: at 07:02

## 2018-05-16 RX ADMIN — FAMOTIDINE 20 MG: 20 TABLET ORAL at 08:00

## 2018-05-16 RX ADMIN — OXYCODONE HYDROCHLORIDE AND ACETAMINOPHEN 1 TABLET: 5; 325 TABLET ORAL at 04:19

## 2018-05-16 RX ADMIN — OXYCODONE HYDROCHLORIDE AND ACETAMINOPHEN 1 TABLET: 5; 325 TABLET ORAL at 09:32

## 2018-05-16 NOTE — PROGRESS NOTES
Bedside shift change report given to Alayna Smith RN (oncoming nurse) by Bunny Nicolas RN (offgoing nurse). Report included the following information SBAR, Kardex, ED Summary, Procedure Summary, Intake/Output, MAR, Accordion, Recent Results, Med Rec Status, Cardiac Rhythm NSR and Alarm Parameters .

## 2018-05-16 NOTE — PROGRESS NOTES
Hospital PCP [facility] follow-up appointment with Kimberlee De Oliveira on Wednesday May 23,2018 @ 2:30p.m.  Added to AVS.   Yobany Chacon CM Specialist

## 2018-05-16 NOTE — PROGRESS NOTES
Reviewed new medications with pt including Aspirin and Eliquis. Side effects reviewed. Handout given and placed at bedside in d/c binder. Pt verbalizes understanding.

## 2018-05-16 NOTE — PROGRESS NOTES
Bedside RN performed patient education and medication education. Discharge concerns initiated and discussed with patient, including clarification on \"who\" assists the patient at their home and instructions for when the home going patient should call their provider after discharge. Opportunity for questions and clarification was provided. Patient receptive to education: YES  Patient stated: \"Dr. Leslie Silva said I could leave today. Which other doctors do I need to see to be discharged? \"  Barriers to Education: None  Diagnosis Education given:  YES    Length of stay: 2  Expected Day of Discharge: Today  Ask if they have \"Help at Home\" & add to white board? YES    Education Day #: 1    Medication Education Given:  YES  M in the box Medication name: Eliquis, ASA, Lipitor    Pt aware of HCAHPS survey: YES            Stroke Education documented in Patient Education: YES  Core Measures Documented in [facility]:  Risk Factors: YES  Warning signs of stroke: YES  When to Activate 911: YES  Medication Education for Risk Factors: YES  Smoking cessation if applicable: N/A  Written Education Given:  YES    Discharge NIH Completed: YES  Score: 0    BRAINS: YES    Follow Up Appointment Made: NO  Date/Time if applicable: Pt will schedule to see Dr. Leslie Silva in 1 month for a CTA      I have reviewed discharge instructions with the patient and spouse. The patient and spouse verbalized understanding. Discharge medications reviewed with patient and spouse and appropriate educational materials and side effects teaching were provided. Signed copy placed on chart; pt's copy placed in d/c binder. Signed prescriptions given to pt. PIV removed. Pt's spouse providing transportation home. Pt wheeled downstairs by staff.

## 2018-05-16 NOTE — PROGRESS NOTES
Occupational Therapy neurological EVALUATION with discharge  Patient: Bozena Ambriz (10 y.o. female)  Date: 5/16/2018  Primary Diagnosis: Lorrie syndrome        Precautions:        ASSESSMENT:  Pt was cleared for OT by RN. Pt received in bed and agreeable to OT. Based on the objective data described below the patient presents with intact BUE function (Fugl-Cummings score 66/66) and independent with ADL and related mobility. Pt denied dizziness and dyspnea with mobility and denied visual symptoms. Pt has family to assist her PRN during recovery at home. 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 Pt and family reside in 2 story home but are moving into fist level apartment (using 's) this weekend. Further skilled acute occupational therapy is not indicated at this time. Discharge Recommendations: None  Further Equipment Recommendations for Discharge: None     SUBJECTIVE:   Patient stated I am doing very well thanks. I am roselia.     OBJECTIVE DATA SUMMARY:   HISTORY:   Past Medical History:   Diagnosis Date    Elevated cholesterol     Psychiatric disorder     depression, anxiety     Past Surgical History:   Procedure Laterality Date    HX CHOLECYSTECTOMY      HX THYROIDECTOMY         Prior Level of Function/Environment/Context: Pt is looking forward to summer off with her children and using the pool at their apartment complex. Pt stays busy with two teenage children and working at an elementary school.   Occupations in which the patient is/was successful, what are the barriers preventing that success:   Performance Patterns (routines, roles, habits, and rituals):   Personal Interests and/or values:   Expanded or extensive additional review of patient history:     Home Situation  Home Environment: Private residence  One/Two Story Residence: Two story (Moving to 1 level apt this weekend)  Living Alone: No  Support Systems: Family member(s)  Patient Expects to be Discharged to[de-identified] Private residence  Current DME Used/Available at Home: None  Tub or Shower Type: Shower  [x]  Right hand dominant   []  Left hand dominant    EXAMINATION OF PERFORMANCE DEFICITS:  Cognitive/Behavioral Status:  Neurologic State: Alert  Orientation Level: Oriented X4  Cognition: Follows commands; Appropriate decision making  Perception: Appears intact  Perseveration: No perseveration noted  Safety/Judgement: Awareness of environment;Good awareness of safety precautions    Skin: Appears intact BUE    Edema: No edema noted BUE    Hearing: Auditory  Auditory Impairment: None    Vision/Perceptual:    Tracking: Able to track stimulus in all quadrants w/o difficulty              Visual Fields:  (Appears intact)       Acuity: Within Defined Limits         Range of Motion:    AROM: Within functional limits                         Strength:    Strength: Within functional limits                Coordination:     Fine Motor Skills-Upper: Left Intact; Right Intact    Gross Motor Skills-Upper: Left Intact; Right Intact    Tone & Sensation:    Tone: Normal                         Balance:  Sitting: Intact  Standing: Intact    Functional Mobility and Transfers for ADLs:  Bed Mobility:  Supine to Sit: Independent  Sit to Supine: Independent    Transfers:  Sit to Stand: Independent  Functional Transfers  Toilet Transfer : Independent   Bed to Chair: Independent    ADL Assessment:  Feeding: Independent    Oral Facial Hygiene/Grooming: Independent    Bathing: Independent    Upper Body Dressing: Independent    Lower Body Dressing: Independent    Toileting: Independent                ADL Intervention and task modifications:                                     Cognitive Retraining  Safety/Judgement: Awareness of environment;Good awareness of safety precautions           Functional Measure:   Fugl-Cummings Assessment of Motor Recovery after Stroke:     Reflex Activity  Flexors/Biceps/Fingers: Can be elicited  Extensors/Triceps: Can be elicited  Reflex Subtotal: 4    Volitional Movement Within Synergies  Shoulder Retraction: Full  Shoulder Elevation: Full  Shoulder Abduction (90 degrees): Full  Shoulder External Rotation: Full  Elbow Flexion: Full  Forearm Supination: Full  Shoulder Adduction/Internal Rotation: Full  Elbow Extension: Full  Forearm Pronation: Full  Subtotal: 18    Volitional Movement Mixing Synergies  Hand to Lumbar Spine: Full  Shoulder Flexion (0-90 degrees): Full  Pronation-Supination: Full  Subtotal: 6    Volitional Movement With Little or No Synergy  Shoulder Abduction (0-90 degrees): Full  Shoulder Flexion ( degrees): Full  Pronation/Supination: Full  Subtotal : 6    Normal Reflex Activity  Biceps, Triceps, Finger Flexors: Full  Subtotal : 2    Upper Extremity Total   Upper Extremity Total: 36    Wrist  Stability at 15 Degree Dorsiflexion: Full  Repeated Dorsiflexion/ Volar Flexion: Full  Stability at 15 Degree Dorsiflexion: Full  Repeated Dorsiflexion/ Volar Flexion: Full  Circumduction: Full  Wrist Total: 10    Hand  Mass Flexion: Full  Mass Extension: Full  Grasp A: Full  Grasp B: Full  Grasp C: Full  Grasp D: Full  Grasp E: Full  Hand Total: 14    Coordination/Speed  Tremor: None  Dysmetria: None  Time: <1s  Coordination/Speed Total : 6    Total A-D  Total A-D (Motor Function): 66/66     Percentage of impairment CH  0% CI  1-19% CJ  20-39% CK  40-59% CL  60-79% CM  80-99% CN  100%   Fugl-Cummings score: 0-66 66 53-65 39-52 26-38 13-25 1-12   0      This is a reliable/valid measure of arm function after a neurological event. It has established value to characterize functional status and for measuring spontaneous and therapy-induced recovery; tests proximal and distal motor functions. Fugl-Cummings Assessment  UE scores recorded between five and 30 days post neurologic event can be used to predict UE recovery at six months post neurologic event.   Severe = 0-21 points   Moderately Severe = 22-33 points   Moderate = 34-47 points   Mild = 48-66 points  ELAINE Garza, RHONDA Cortez, & ANJUM Tineo (1992). Measurement of motor recovery after stroke: Outcome assessment and sample size requirements. Stroke, 23, pp. 9452-2831.   ------------------------------------------------------------------------------------------------------------------------------------------------------------------  MCID:  Stroke:   Mario Alberto Keating et al, 2001; n = 171; mean age 79 (5) years; assessed within 16 (12) days of stroke, Acute Stroke)  FMA Motor Scores from Admission to Discharge   10 point increase in FMA Upper Extremity = 1.5 change in discharge FIM   10 point increase in FMA Lower Extremity = 1.9 change in discharge FIM  MDC:   Stroke:   Domi Snowden et al, 2008, n = 14, mean age = 59.9 (14.6) years, assessed on average 14 (6.5) months post stroke, Chronic Stroke)   FMA = 5.2 points for the Upper Extremity portion of the assessment     G codes: In compliance with CMSs Claims Based Outcome Reporting, the following G-code set was chosen for this patient based on their primary functional limitation being treated: The outcome measure chosen to determine the severity of the functional limitation was the Fugl-Cummings with a score of 66/66 which was correlated with the impairment scale. ?  Self Care:     - CURRENT STATUS: CH - 0% impaired, limited or restricted    - GOAL STATUS: CH - 0% impaired, limited or restricted    - D/C STATUS:  CH - 0% impaired, limited or restricted      Occupational Therapy Evaluation Charge Determination   History Examination Decision-Making   LOW Complexity : Brief history review  LOW Complexity : 1-3 performance deficits relating to physical, cognitive , or psychosocial skils that result in activity limitations and / or participation restrictions  LOW Complexity : No comorbidities that affect functional and no verbal or physical assistance needed to complete eval tasks       Based on the above components, the patient evaluation is determined to be of the following complexity level: LOW     Pain:  Pain Scale 1: Numeric (0 - 10)  Pain Intensity 1: 0  Pain Location 1: Head  Pain Orientation 1: Lateral;Right  Pain Description 1: Aching  Pain Intervention(s) 1: Medication (see MAR); Rest;Repositioned  Activity Tolerance:   VSS. Pt denied dizzines/dyspnea during session. After treatment:   []  Patient left in no apparent distress sitting up in chair  [x]  Patient left in no apparent distress in bed  [x]  Call bell left within reach  [x]  Nursing notified  []  Caregiver present  []  Bed alarm activated    COMMUNICATION/EDUCATION:   Findings and recommendations were discussed with: Physical Therapist and Registered Nurse    All questions answered with patient indicating understanding. [x]      Home safety education was provided and the patient/caregiver indicated understanding. [x]      Patient/family have participated as able and agree with findings and recommendations. []      Patient is unable to participate in plan of care at this time.     Thank you for this referral.  Anastasia Joseph  Time Calculation: 10 mins

## 2018-05-16 NOTE — PROGRESS NOTES
05/16/2018; 13:50 -   CM did independent chart review. Patient has been seen by PT and OT and has been cleared for discharge with no additional recommendations. Neuro has done assessment of patient and has also cleared the patient for discharge, with plan for a CTA in one (1) month but no other recommendations noted. Patient is pending final evaluation by attending physician, but she should be cleared for discharge on 5/16/18 with no additional CM needs required.   CRM: Cam Alberts, MPH; Z: [phone]

## 2018-05-16 NOTE — DISCHARGE SUMMARY
Discharge Summary       PATIENT ID: Charlanne Meigs  MRN: [id number]   YOB: 1970    DATE OF ADMISSION: 5/14/2018 12:09 PM    DATE OF DISCHARGE:   PRIMARY CARE PROVIDER: Flaca Hall MD     ATTENDING PHYSICIAN:   DISCHARGING PROVIDER: Emeli Bojorquez MD    To contact this individual call [phone] and ask the  to page. If unavailable ask to be transferred the Adult Hospitalist Department. CONSULTATIONS: IP CONSULT TO OPHTHALMOLOGY  IP CONSULT TO NEUROLOGY  IP CONSULT TO NEUROINTERVENTIONAL SURGERY  IP CONSULT TO HOSPITALIST    PROCEDURES/SURGERIES: * No surgery found *    ADMITTING DIAGNOSES:   42 y/o female with PMH significant for HLD, Migraine presented with right ptosis. Yesterday morning when she woke up, she noted her right pupil was smaller than her left and her right eyelid appeared to be drooping, also has severe headache and some photophobia .  No focal weakness, numbness, vision/speech deficits.    Denies recent head/neck trauma, falls, cervicalgia.  Patient reports that about a week back, she started having some nausea/vomiting and then had diarrhea for three days. Head CT on arrival revealed no acute process. HOSPITAL COURSE:  DISCHARGE DIAGNOSES / PLAN:      #. 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 -5/15 MRA roosevelt/neck reported- Right internal carotid artery dissection begins in the mid cervical ICA and extends to the proximal petrous portion of the ICA with continued diminished luminal dimension throughout the cavernous and supraclinoid ICA.  No evidence of acute cerebral infarction.    -Neurology and interventional neurology consulted  - loading dose given; continue statin  -CTA head/neck obtained, which reported -Severe narrowing in the mid to distal right cervical internal carotid artery with near total occlusion at its junction with the petrous segment. There is reconstitution distally but with slightly diminished caliber in the petrous and cavernous segments. Findings are compatible with dissection. Slight irregular contour proximally suggests underlying vasculitis.  -Per neurology and interventional neurology, medial management suggested at this time ; patient started on Anticoagulation/Eliquis 5mg bid and ASA 81mg daily. Repeat CTA will be performed in 4 weeks. If the vessel does not improve, delayed elective stenting will be discussed with the patient after a longer period of anticoagulation.     #. HLD: on statin  #. Hypokalemia, replaced    #. Morbid obesity; BMI- 41.38 kg/m 2       PENDING TEST RESULTS:   At the time of discharge the following test results are still pending: none    FOLLOW UP APPOINTMENTS:    Follow-up Information     Follow up With Details Comments Contact Info    Caroline Elise MD On 5/23/2018 Hospital PCP f/u appointment on Wednesday May 23 @ 2:30 p.m. [facility]  [address]       Apryl Bustamante MD In 1 month For follow up and repeat [address] [address]  [phone]             ADDITIONAL CARE RECOMMENDATIONS:  Follow up with interventional neurologist Dr Peggy Petersen in 1 month    DIET: Regular Diet  ACTIVITY:  No Strenuous exercise, No heavy lifting, pushing, pulling       DISCHARGE MEDICATIONS:  Current Discharge Medication List      START taking these medications    Details   apixaban (ELIQUIS) 5 mg tablet Take 1 Tab by mouth two (2) times a day. Indications: Cerebral Thromboembolism Prevention  Qty: 60 Tab, Refills: 0      aspirin 81 mg chewable tablet Take 1 Tab by mouth daily. Qty: 30 Tab, Refills: 0      famotidine (PEPCID) 20 mg tablet Take 1 Tab by mouth every twelve (12) hours. Qty: 60 Tab, Refills: 0      oxyCODONE-acetaminophen (PERCOCET) 5-325 mg per tablet Take 1 Tab by mouth every eight (8) hours as needed.  Max Daily Amount: 3 Tabs.  Qty: 20 Tab, Refills: 0    Associated Diagnoses: Carotid artery dissection ([address]         CONTINUE these medications which have NOT CHANGED    Details   ALPRAZolam (XANAX) 0.5 mg tablet Take  by mouth two (2) times daily as needed for Anxiety. levothyroxine (UNITHROID) 150 mcg tablet Take  by mouth Daily (before breakfast). sertraline (ZOLOFT) 100 mg tablet Take 100 mg by mouth daily. atorvastatin (LIPITOR) 10 mg tablet Take  by mouth daily. ibuprofen (MOTRIN) 400 mg tablet Take 400 mg by mouth daily. NOTIFY YOUR PHYSICIAN FOR ANY OF THE FOLLOWING:   Fever over 101 degrees for 24 hours. Chest pain, shortness of breath, fever, chills, nausea, vomiting, diarrhea, change in mentation, falling, weakness, bleeding. Severe pain or pain not relieved by medications. Or, any other signs or symptoms that you may have questions about. DISPOSITION:   x Home With:   OT  PT  HH  RN       Long term SNF/Inpatient Rehab    Independent/assisted living    Hospice    Other:       PATIENT CONDITION AT DISCHARGE:     Functional status    Poor     Deconditioned    x Independent      Cognition    x Lucid     Forgetful     Dementia      Catheters/lines (plus indication)    Espinal     PICC     PEG    x None      Code status   x  Full code     DNR      PHYSICAL EXAMINATION AT DISCHARGE:  Constitutional:  No acute distress, cooperative, pleasant    ENT:  Right pupil is about 1.5-2 mm in diameter and mildly sluggish in response.  The left pupil is normal size, equal and reactive.  The right eye shows ptosis. Oral mucous moist, oropharynx benign. Neck supple,    Resp:  CTA bilaterally. No wheezing/rhonchi/rales. No accessory muscle use   CV:  Regular rhythm, normal rate, no murmurs, gallops, rubs    GI:  Soft, non distended, non tender. normoactive bowel sounds, no hepatosplenomegaly     Musculoskeletal:  No edema, warm, 2+ pulses throughout    Neurologic:  Moves all extremities.   AAOx3, CN II-XII reviewed                                             Psych:  Good insight, Not anxious nor agitated.         CHRONIC MEDICAL DIAGNOSES:  Problem List as of 5/16/2018  Date Reviewed: 5/14/2018          Codes Class Noted - Resolved    * (Principal)Carotid artery dissection ([facility] ICD-10-CM: I77.71  ICD-9-CM: 443.21  5/15/2018 - Present        Lorrie syndrome ICD-10-CM: G90.2  ICD-9-CM: 337.9  5/14/2018 - Present              Greater than 35 minutes were spent with the patient on counseling and coordination of care    Signed:   Corine Ch MD  5/16/2018  2:20 PM

## 2018-05-16 NOTE — PROGRESS NOTES
CTA reviewed. There is interval worsened narrowing of the distal right internal carotid artery (near occlusion). The CT appearance taken together with the clinical history is most consistent with a spontaneous dissection. FMD cannot be excluded as a causative etiology. Collateral flow is excellent. Case discussed with neurology (Dr. Jaclyn Nelson). Will attempt medical management for now. Eliquis 5 mg BID + aspirin 81 mg qday in an effort to preserve the vessel. If neurological symptoms occur, stenting would be performed. Stenting in the acute phase would have significant risk of thromboembolic complications. After discharge, I will follow as outpatient. Repeat CTA will be performed in 4 weeks. If the vessel does not improve, delayed elective stenting will be discussed with the patient after a longer period of anticoagulation.     Flaquita Severino [facility]  [phone]

## 2018-05-16 NOTE — PROGRESS NOTES
No events overnight. Eliquis 5 mg BID started. 81 mg ASA  Neuro exam stable, no focal deficit    TATO spontaneous dissection, statistically most likely FMD.    Clinical appearance and history are not consistent with a vasculitis  OK for discharge from my standpoint  We will manage as outpatient. Plan CTA in 1 month. Info provided  Counseled on signs and symptoms of stroke, \"call 911\"  All questions answered. Sign off.       MEHDI

## 2018-05-16 NOTE — DISCHARGE INSTRUCTIONS
Discharge Instructions       PATIENT ID: Andre Costa  MRN: [id number]   YOB: 1970    DATE OF ADMISSION: 5/14/2018 12:09 PM    DATE OF DISCHARGE: 5/16/2018    PRIMARY CARE PROVIDER: Sandy Alberts MD     ATTENDING PHYSICIAN: Brooke Cronin MD  DISCHARGING PROVIDER: Brooke Cronin MD    To contact this individual call [phone] and ask the  to page. If unavailable ask to be transferred the Adult Hospitalist Department. DISCHARGE DIAGNOSES   #. 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 Right carotid artery dissection  #. HLD: on statin  #. Hypokalemia, replaced    CONSULTATIONS: IP CONSULT TO OPHTHALMOLOGY  IP CONSULT TO NEUROLOGY  IP CONSULT TO NEUROINTERVENTIONAL SURGERY  IP CONSULT TO HOSPITALIST    PROCEDURES/SURGERIES: * No surgery found *    PENDING TEST RESULTS:   At the time of discharge the following test results are still pending: none    FOLLOW UP APPOINTMENTS:   Follow-up Information     Follow up With Details Comments Ul. Mike Camacho MD   [address]  [phone]             ADDITIONAL CARE RECOMMENDATIONS:  Follow up with interventional neurologist Dr Arden Gaytan in 1 month    DIET: Regular Diet      ACTIVITY: No Strenuous exercise, No heavy lifting, pushing, pulling       DISCHARGE MEDICATIONS:   See Medication Reconciliation Form    · It is important that you take the medication exactly as they are prescribed. · Keep your medication in the bottles provided by the pharmacist and keep a list of the medication names, dosages, and times to be taken in your wallet. · Do not take other medications without consulting your doctor. NOTIFY YOUR PHYSICIAN FOR ANY OF THE FOLLOWING:   Fever over 101 degrees for 24 hours. Chest pain, shortness of breath, fever, chills, nausea, vomiting, diarrhea, change in mentation, falling, weakness, bleeding.  Severe pain or pain not relieved by medications. Or, any other signs or symptoms that you may have questions about. DISPOSITION:   x Home With:   OT  PT  HH  RN       SNF/Inpatient Rehab/LTAC    Independent/assisted living    Hospice    Other:         Signed:   Isaac José MD  5/16/2018  2:04 PM    Apixaban (By mouth)   Apixaban (a-PIX-a-ban)  Treats and prevents blood clots. This medicine is a blood thinner. Brand Name(s): Eliquis   There may be other brand names for this medicine. When This Medicine Should Not Be Used: This medicine is not right for everyone. Do not use it if you had an allergic reaction to apixaban or you have active bleeding. How to Use This Medicine:   Tablet  · Your doctor will tell you how much medicine to use. Do not use more than directed. · If you are not able to swallow the tablets whole, they may be crushed and mixed in water, 5% dextrose in water (D5W), apple juice, or applesauce. The crushed tablets may be mixed with 60 mL of water or D5W dose and given through a nasogastric tube (NGT). · This medicine should come with a Medication Guide. Ask your pharmacist for a copy if you do not have one. · Missed dose: Take a dose as soon as you remember. If it is almost time for your next dose, wait until then and take a regular dose. Do not take extra medicine to make up for a missed dose. · Store the medicine in a closed container at room temperature, away from heat, moisture, and direct light. Drugs and Foods to Avoid:   Ask your doctor or pharmacist before using any other medicine, including over-the-counter medicines, vitamins, and herbal products. · Some medicines can affect how apixaban works.  Tell your doctor if you are using any of the following:   ¨ Carbamazepine, clarithromycin, itraconazole, ketoconazole, phenytoin, rifampin, ritonavir, Aide's wort  ¨ Blood thinner (including clopidogrel, heparin, prasugrel, warfarin)  ¨ Medicine to treat depression  ¨ NSAID pain or arthritis medicine (including aspirin, celecoxib, diclofenac, ibuprofen, naproxen)  Warnings While Using This Medicine:   · Tell your doctor if you are pregnant or breastfeeding, or if you have kidney disease, liver disease, bleeding problems, or an artificial heart valve. · Do not stop using this medicine suddenly without asking your doctor. You might have a higher risk of stroke for a short time after you stop using this medicine. · This medicine increases your risk for bleeding that can become serious if not controlled. You may also bruise easily, and it may take longer than usual for bleeding to stop. · This medicine may increase your risk for blood clots in your spine or back if you undergo an epidural or spinal puncture. This could lead to paralysis. Tell your doctor if you ever had spine problems or back surgery. · Tell any doctor or dentist who treats you that you are using this medicine. With your doctor's supervision, you may need to stop using this medicine several days before you have surgery or medical tests. · Your doctor will do lab tests at regular visits to check on the effects of this medicine. Keep all appointments. · Keep all medicine out of the reach of children. Never share your medicine with anyone. Possible Side Effects While Using This Medicine:   Call your doctor right away if you notice any of these side effects:  · Allergic reaction: Itching or hives, swelling in your face or hands, swelling or tingling in your mouth or throat, chest tightness, trouble breathing  · Change in how much or how often you urinate, red or pink urine  · Chest pain, trouble breathing  · Coughing up blood, vomiting blood or material that looks like coffee grounds  · Numbness, tingling, or muscle weakness in your legs or feet  · Red or black, tarry stools  · Unusual bleeding, bruising, or weakness  If you notice other side effects that you think are caused by this medicine, tell your doctor.    Call your doctor for medical advice about side effects. You may report side effects to FDA at 3-222-FDA-0791  © 2017 [address]  Information is for End User's use only and may not be sold, redistributed or otherwise used for commercial purposes. The above information is an  only. It is not intended as medical advice for individual conditions or treatments. Talk to your doctor, nurse or pharmacist before following any medical regimen to see if it is safe and effective for you.

## 2018-05-16 NOTE — PROGRESS NOTES
Physical Therapy note  5/16/18    Order acknowledged and chart reviewed. No plans by [facility] for surgical intervention at this time. Spoke with PT who reports she has been up ad neela with no focal weakness, coordination, or balance deficits noted - reports zero mobility concerns and states is likely pending discharge today. Pt declining formal PT assessment at this time. Reviewed [facility] FAST education and answered questions to satisfaction. Will complete order.     Thank you,  Anabela Alcazar, PT, DPT

## 2018-05-23 ENCOUNTER — TELEPHONE (OUTPATIENT)
Dept: NEUROSURGERY | Age: 48
End: 2018-05-23

## 2018-05-23 DIAGNOSIS — G90.2 HORNER SYNDROME: ICD-10-CM

## 2018-05-23 DIAGNOSIS — I77.71 CAROTID ARTERY DISSECTION (HCC): Primary | ICD-10-CM

## 2018-05-26 ENCOUNTER — HOSPITAL ENCOUNTER (OUTPATIENT)
Dept: CT IMAGING | Age: 48
Discharge: HOME | End: 2018-05-26
Attending: NURSE PRACTITIONER
Payer: COMMERCIAL

## 2018-05-26 DIAGNOSIS — I77.71 CAROTID ARTERY DISSECTION (HCC): ICD-10-CM

## 2018-05-26 PROCEDURE — 70498 CT ANGIOGRAPHY NECK: CPT

## 2018-05-26 PROCEDURE — 70496 CT ANGIOGRAPHY HEAD: CPT

## 2018-05-26 PROCEDURE — 74011636320 HC RX REV CODE- 636/320: Performed by: NURSE PRACTITIONER

## 2018-05-26 PROCEDURE — 74011000258 HC RX REV CODE- 258: Performed by: NURSE PRACTITIONER

## 2018-05-26 RX ORDER — SODIUM CHLORIDE 0.9 % (FLUSH) 0.9 %
10 SYRINGE (ML) INJECTION
Status: COMPLETED | OUTPATIENT
Start: 2018-05-26 | End: 2018-05-26

## 2018-05-26 RX ADMIN — IOPAMIDOL 100 ML: 755 INJECTION, SOLUTION INTRAVENOUS at 13:00

## 2018-05-26 RX ADMIN — Medication 10 ML: at 13:00

## 2018-05-26 RX ADMIN — SODIUM CHLORIDE 100 ML: 900 INJECTION, SOLUTION INTRAVENOUS at 13:00

## 2018-06-05 ENCOUNTER — OFFICE VISIT (OUTPATIENT)
Dept: NEUROSURGERY | Age: 48
End: 2018-06-05

## 2018-06-05 VITALS
TEMPERATURE: 97.7 F | HEART RATE: 97 BPM | DIASTOLIC BLOOD PRESSURE: 94 MMHG | SYSTOLIC BLOOD PRESSURE: 125 MMHG | OXYGEN SATURATION: 99 % | RESPIRATION RATE: 18 BRPM

## 2018-06-05 DIAGNOSIS — I77.71 DISSECTION OF CAROTID ARTERY (HCC): Primary | ICD-10-CM

## 2018-06-05 RX ORDER — LEVOTHYROXINE SODIUM 150 UG/1
TABLET ORAL
COMMUNITY
End: 2021-01-21

## 2018-06-05 RX ORDER — SERTRALINE HYDROCHLORIDE 100 MG/1
TABLET, FILM COATED ORAL DAILY
COMMUNITY
End: 2021-01-21

## 2018-06-05 RX ORDER — ALPRAZOLAM 0.5 MG/1
TABLET ORAL
COMMUNITY
End: 2021-01-21

## 2018-06-05 RX ORDER — GUAIFENESIN 100 MG/5ML
81 LIQUID (ML) ORAL DAILY
COMMUNITY
End: 2018-08-20 | Stop reason: DRUGHIGH

## 2018-06-05 RX ORDER — ATORVASTATIN CALCIUM 10 MG/1
TABLET, FILM COATED ORAL DAILY
COMMUNITY
End: 2021-01-21

## 2018-06-05 NOTE — MR AVS SNAPSHOT
[address] [address] [address] [place] [address] 
[phone] Patient: Dru Helton MRN: [id number] Olinda Simmons Visit Information Date & Time Provider Department Dept. Phone Encounter #  
 6/5/2018  1:00 PM Jani Samuelssonja Downey 82 Neurointerventional Surgery [id number] Follow-up Instructions Return in about 6 weeks (around 7/17/2018) for CTA review, TATO dissection. Allergies as of 6/5/2018  Review Complete On: 6/5/2018 By: Radha Prabhakar CNA Severity Noted Reaction Type Reactions Pcn [Penicillins]  06/05/2018    Itching Current Immunizations  Never Reviewed No immunizations on file. Not reviewed this visit You Were Diagnosed With   
  
 Codes Comments Dissection of carotid artery (HCC)    -  Primary ICD-10-CM: I77.71 ICD-9-CM: 443.21 Vitals BP Pulse Temp Resp SpO2  
  
 (!) 125/94 (BP 1 Location: Right arm, BP Patient Position: Sitting) 97 97.7 °F (36.5 °C) (Oral) 18 99% Your Updated Medication List  
  
   
This list is accurate as of 6/5/18  1:28 PM.  Always use your most recent med list.  
  
  
  
  
 aspirin 81 mg chewable tablet Take 81 mg by mouth daily. LIPITOR 10 mg tablet Generic drug:  atorvastatin Take  by mouth daily. sertraline 100 mg tablet Commonly known as:  ZOLOFT Take  by mouth daily. UNITHROID 150 mcg tablet Generic drug:  levothyroxine Take  by mouth Daily (before breakfast). XANAX 0.5 mg tablet Generic drug:  ALPRAZolam  
Take  by mouth. Follow-up Instructions Return in about 6 weeks (around 7/17/2018) for CTA review, TATO dissection. To-Do List   
 06/13/2019 Imaging:  CTA NECK Patient Instructions CTA to be performed in 6 weeks. Followup in office to review. Blood pressure goal < 120/80 Continue Eliquis and aspirin as previously instructed. No strenuous physical activity for now. Call 911 for any new stroke symptoms. Call my office if a pulsatile sound develops in your right ear. Introducing [facility]! [facility] introduces [employer] patient portal. Now you can access parts of your medical record, email your doctor's office, and request medication refills online. 1. In your internet browser, go to [email or web address] 2. Click on the First Time User? Click Here link in the Sign In box. You will see the New Member Sign Up page. 3. Enter your [employer] Access Code exactly as it appears below. You will not need to use this code after youve completed the sign-up process. If you do not sign up before the expiration date, you must request a new code. · [employer] Access Code: [email or web address] Expires: 8/22/2018 12:06 PM 
 
4. Enter the last four digits of your Social Security Number (xxxx) and Date of Birth (mm/dd/yyyy) as indicated and click Submit. You will be taken to the next sign-up page. 5. Create a [employer] ID. This will be your [employer] login ID and cannot be changed, so think of one that is secure and easy to remember. 6. Create a [employer] password. You can change your password at any time. 7. Enter your Password Reset Question and Answer. This can be used at a later time if you forget your password. 8. Enter your e-mail address. You will receive e-mail notification when new information is available in [address] 9. Click Sign Up. You can now view and download portions of your medical record. 10. Click the Download Summary menu link to download a portable copy of your medical information. If you have questions, please visit the Frequently Asked Questions section of the [employer] website. Remember, [employer] is NOT to be used for urgent needs. For medical emergencies, dial 911. Now available from your iPhone and Android! Please provide this summary of care documentation to your next provider. If you have any questions after today's visit, please call [phone].

## 2018-06-05 NOTE — PATIENT INSTRUCTIONS
CTA to be performed in 6 weeks. Followup in office to review. Blood pressure goal < 120/80    Continue Eliquis and aspirin as previously instructed. No strenuous physical activity for now. Call 911 for any new stroke symptoms. Call my office if a pulsatile sound develops in your right ear.

## 2018-06-06 RX ORDER — APIXABAN 5 MG/1
TABLET, FILM COATED ORAL
Refills: 0 | COMMUNITY
Start: 2018-05-16 | End: 2018-08-20 | Stop reason: ALTCHOICE

## 2018-06-06 RX ORDER — FAMOTIDINE 20 MG/1
TABLET, FILM COATED ORAL
Refills: 0 | COMMUNITY
Start: 2018-05-16 | End: 2021-01-21

## 2018-06-06 RX ORDER — CYCLOBENZAPRINE HCL 10 MG
TABLET ORAL
Refills: 3 | COMMUNITY
Start: 2018-05-06 | End: 2021-01-21

## 2018-06-06 NOTE — PROGRESS NOTES
Neurointerventional Surgery  Ambulatory Progress Note      Patient: Mervat Ferreira MRN: [id number]  SSN: [id number]    YOB: 1970  Age: 41 y.o. 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 MRI demonstrated no infarct. The patient never experience focal neurological symptoms or neck pain. The event was not associated with trauma. She is not a smoker. She was placed on Eliquis and aspirin prior to discharge to reduce the risk of thromboembolic stroke. Repeat CTA was performed approximately 10 days after discharge when the patient experienced increased pressure sensation in the right posterior neck. The followup CTA demonstrated improvement in the ICA caliber. The vessel appeared to be healing without complication. She is here today for routine clinical followup. She denies focal neurological symptoms, headache, neck ache or vision changes currently. A comprehensive ROS was performed and was negative except for as per HPI. Objective:     Current Outpatient Prescriptions   Medication Sig Dispense Refill    atorvastatin (LIPITOR) 10 mg tablet Take  by mouth daily.  sertraline (ZOLOFT) 100 mg tablet Take  by mouth daily.  ALPRAZolam (XANAX) 0.5 mg tablet Take  by mouth.  aspirin 81 mg chewable tablet Take 81 mg by mouth daily.  levothyroxine (UNITHROID) 150 mcg tablet Take  by mouth Daily (before breakfast). Visit Vitals    BP (!) 125/94 (BP 1 Location: Right arm, BP Patient Position: Sitting)    Pulse 97    Temp 97.7 °F (36.5 °C) (Oral)    Resp 18    SpO2 99%       Allergies   Allergen Reactions    Pcn [Penicillins] Itching       Current Outpatient Prescriptions   Medication Sig    atorvastatin (LIPITOR) 10 mg tablet Take  by mouth daily.     sertraline (ZOLOFT) 100 mg tablet Take by mouth daily.  ALPRAZolam (XANAX) 0.5 mg tablet Take  by mouth.  aspirin 81 mg chewable tablet Take 81 mg by mouth daily.  levothyroxine (UNITHROID) 150 mcg tablet Take  by mouth Daily (before breakfast). No current facility-administered medications for this visit. Physical Exam:  General: NAD  Lungs: clear to auscultation bilaterally  Heart: regular rate and rhythm, S1, S2 normal, no murmur, click, rub or gallop  Extremities: extremities normal, atraumatic, no cyanosis or edema  Pulses: 2+ and symmetric    Neurologic Exam:  Mental Status:  Alert and oriented x 4. Appropriate affect, mood and behavior. Language:    Normal fluency, repetition, comprehension and naming. Cranial Nerves:   Pupils equal, round and reactive to light. Visual fields full to confrontation. Extraocular movements intact. Facial sensation intact V1 - V3. Full facial strength, no asymmetry. Hearing intact bilaterally. No dysarthria. Tongue protrudes to midline, palate elevates symmetrically. Shoulder shrug 5/5 bilaterally. Motor:    No pronator drift. Bulk and tone normal.      5/5 power in all extremities proximally and distally. No involuntary movements. Sensation:    Sensation intact throughout to light touch. Coordination & Gait: Normal, tandem gait normal, FTN and HTS intact. Labs:  No results found for: NA, K, CL, CO2, AGAP, GLU, BUN, CREA, BUCR, GFRAA, GFRNA, CA, GFRAA  No results found for: WBC, WBCLT, HGBPOC, HGB, HGBP, HCTPOC, HCT, PHCT, RBCH, PLT, MCV, HGBEXT, HCTEXT, PLTEXT  No results found for: MCH, MCHC, BASOS, ABL, ABM, SEGUNDO, ABB, PHOS, MG    IMAGING:    CTA reviewed as per HPI. Assessment/Plan:       ICD-10-CM ICD-9-CM    1. Dissection of carotid artery (HCC) I77.71 443.21 CTA NECK        - TATO dissection appears to be healing without complication so far.   The caliber is still quite narrow and stenting may still be indicated in the future if the vessel heals with flow limiting stenosis. - Continue Eliquis and ASA until followup CTA is completed    - Followup neck CTA in 6 weeks. - Stroke signs and symptoms discussed with the patient with instructions to call 911 if any occur.    - This chart has been split under two medical record numbers. Merger of the inpatient and outpatient charts is in progress. Follow-up Disposition:    I have discussed the diagnosis with the patient and the intended plan as seen in the above orders. Patient is in agreement. The patient has received an after-visit summary and questions were answered concerning future plans. I have discussed medication side effects and warnings with the patient as well.       Kervin Anguiano MD

## 2018-06-09 ENCOUNTER — TELEPHONE (OUTPATIENT)
Dept: NEUROSURGERY | Age: 48
End: 2018-06-09

## 2018-06-09 ENCOUNTER — HOSPITAL ENCOUNTER (EMERGENCY)
Age: 48
Discharge: HOME | End: 2018-06-09
Attending: EMERGENCY MEDICINE
Payer: COMMERCIAL

## 2018-06-09 ENCOUNTER — APPOINTMENT (OUTPATIENT)
Dept: CT IMAGING | Age: 48
End: 2018-06-09
Attending: PHYSICIAN ASSISTANT
Payer: COMMERCIAL

## 2018-06-09 VITALS
RESPIRATION RATE: 12 BRPM | HEIGHT: 66 IN | BODY MASS INDEX: 39.63 KG/M2 | OXYGEN SATURATION: 91 % | TEMPERATURE: 97.6 F | WEIGHT: 246.6 LBS | DIASTOLIC BLOOD PRESSURE: 54 MMHG | HEART RATE: 66 BPM | SYSTOLIC BLOOD PRESSURE: 100 MMHG

## 2018-06-09 DIAGNOSIS — R51.9 NONINTRACTABLE HEADACHE, UNSPECIFIED CHRONICITY PATTERN, UNSPECIFIED HEADACHE TYPE: Primary | ICD-10-CM

## 2018-06-09 LAB
ALBUMIN SERPL-MCNC: 3.8 G/DL (ref 3.5–5)
ALBUMIN/GLOB SERPL: 1 {RATIO} (ref 1.1–2.2)
ALP SERPL-CCNC: 73 U/L (ref 45–117)
ALT SERPL-CCNC: 20 U/L (ref 12–78)
ANION GAP BLD CALC-SCNC: 19 MMOL/L (ref 10–20)
AST SERPL-CCNC: 7 U/L (ref 15–37)
BASOPHILS # BLD: 0.1 K/UL (ref 0–0.1)
BASOPHILS NFR BLD: 1 % (ref 0–1)
BILIRUB DIRECT SERPL-MCNC: 0.1 MG/DL (ref 0–0.2)
BILIRUB SERPL-MCNC: 0.2 MG/DL (ref 0.2–1)
BUN BLD-MCNC: 10 MG/DL (ref 9–20)
CA-I BLD-MCNC: 1.05 MMOL/L (ref 1.12–1.32)
CHLORIDE BLD-SCNC: 104 MMOL/L (ref 98–107)
CO2 BLD-SCNC: 24 MMOL/L (ref 21–32)
CREAT BLD-MCNC: 0.7 MG/DL (ref 0.6–1.3)
DIFFERENTIAL METHOD BLD: ABNORMAL
EOSINOPHIL # BLD: 0.1 K/UL (ref 0–0.4)
EOSINOPHIL NFR BLD: 1 % (ref 0–7)
ERYTHROCYTE [DISTWIDTH] IN BLOOD BY AUTOMATED COUNT: 12.9 % (ref 11.5–14.5)
GLOBULIN SER CALC-MCNC: 3.7 G/DL (ref 2–4)
GLUCOSE BLD-MCNC: 99 MG/DL (ref 65–100)
HCT VFR BLD AUTO: 37.4 % (ref 35–47)
HCT VFR BLD CALC: 36 % (ref 35–47)
HGB BLD-MCNC: 11.9 G/DL (ref 11.5–16)
IMM GRANULOCYTES # BLD: 0.1 K/UL (ref 0–0.04)
IMM GRANULOCYTES NFR BLD AUTO: 1 % (ref 0–0.5)
LYMPHOCYTES # BLD: 3 K/UL (ref 0.8–3.5)
LYMPHOCYTES NFR BLD: 37 % (ref 12–49)
MCH RBC QN AUTO: 29.2 PG (ref 26–34)
MCHC RBC AUTO-ENTMCNC: 31.8 G/DL (ref 30–36.5)
MCV RBC AUTO: 91.7 FL (ref 80–99)
MONOCYTES # BLD: 0.5 K/UL (ref 0–1)
MONOCYTES NFR BLD: 6 % (ref 5–13)
NEUTS SEG # BLD: 4.4 K/UL (ref 1.8–8)
NEUTS SEG NFR BLD: 54 % (ref 32–75)
NRBC # BLD: 0 K/UL (ref 0–0.01)
NRBC BLD-RTO: 0 PER 100 WBC
PLATELET # BLD AUTO: 302 K/UL (ref 150–400)
PMV BLD AUTO: 9.1 FL (ref 8.9–12.9)
POTASSIUM BLD-SCNC: 3.9 MMOL/L (ref 3.5–5.1)
PROT SERPL-MCNC: 7.5 G/DL (ref 6.4–8.2)
RBC # BLD AUTO: 4.08 M/UL (ref 3.8–5.2)
SERVICE CMNT-IMP: ABNORMAL
SODIUM BLD-SCNC: 141 MMOL/L (ref 136–145)
WBC # BLD AUTO: 8.1 K/UL (ref 3.6–11)

## 2018-06-09 PROCEDURE — 80047 BASIC METABLC PNL IONIZED CA: CPT

## 2018-06-09 PROCEDURE — 70450 CT HEAD/BRAIN W/O DYE: CPT

## 2018-06-09 PROCEDURE — 74011636320 HC RX REV CODE- 636/320

## 2018-06-09 PROCEDURE — 74011250636 HC RX REV CODE- 250/636: Performed by: EMERGENCY MEDICINE

## 2018-06-09 PROCEDURE — 36415 COLL VENOUS BLD VENIPUNCTURE: CPT | Performed by: PHYSICIAN ASSISTANT

## 2018-06-09 PROCEDURE — 96375 TX/PRO/DX INJ NEW DRUG ADDON: CPT

## 2018-06-09 PROCEDURE — 80076 HEPATIC FUNCTION PANEL: CPT | Performed by: EMERGENCY MEDICINE

## 2018-06-09 PROCEDURE — 74011000258 HC RX REV CODE- 258: Performed by: EMERGENCY MEDICINE

## 2018-06-09 PROCEDURE — 74011250636 HC RX REV CODE- 250/636: Performed by: PHYSICIAN ASSISTANT

## 2018-06-09 PROCEDURE — 70496 CT ANGIOGRAPHY HEAD: CPT

## 2018-06-09 PROCEDURE — 96374 THER/PROPH/DIAG INJ IV PUSH: CPT

## 2018-06-09 PROCEDURE — 99285 EMERGENCY DEPT VISIT HI MDM: CPT

## 2018-06-09 PROCEDURE — 85025 COMPLETE CBC W/AUTO DIFF WBC: CPT | Performed by: PHYSICIAN ASSISTANT

## 2018-06-09 RX ORDER — HYDROMORPHONE HYDROCHLORIDE 2 MG/1
2 TABLET ORAL
Qty: 8 TAB | Refills: 0 | Status: SHIPPED | OUTPATIENT
Start: 2018-06-09 | End: 2021-01-21

## 2018-06-09 RX ORDER — SODIUM CHLORIDE 0.9 % (FLUSH) 0.9 %
10 SYRINGE (ML) INJECTION
Status: COMPLETED | OUTPATIENT
Start: 2018-06-09 | End: 2018-06-09

## 2018-06-09 RX ORDER — HYDROMORPHONE HYDROCHLORIDE 1 MG/ML
1 INJECTION, SOLUTION INTRAMUSCULAR; INTRAVENOUS; SUBCUTANEOUS
Status: COMPLETED | OUTPATIENT
Start: 2018-06-09 | End: 2018-06-09

## 2018-06-09 RX ORDER — ONDANSETRON 2 MG/ML
4 INJECTION INTRAMUSCULAR; INTRAVENOUS
Status: COMPLETED | OUTPATIENT
Start: 2018-06-09 | End: 2018-06-09

## 2018-06-09 RX ADMIN — SODIUM CHLORIDE 100 ML: 900 INJECTION, SOLUTION INTRAVENOUS at 11:45

## 2018-06-09 RX ADMIN — Medication 10 ML: at 11:45

## 2018-06-09 RX ADMIN — ONDANSETRON 4 MG: 2 INJECTION INTRAMUSCULAR; INTRAVENOUS at 11:25

## 2018-06-09 RX ADMIN — IOPAMIDOL 100 ML: 755 INJECTION, SOLUTION INTRAVENOUS at 11:44

## 2018-06-09 RX ADMIN — HYDROMORPHONE HYDROCHLORIDE 1 MG: 1 INJECTION, SOLUTION INTRAMUSCULAR; INTRAVENOUS; SUBCUTANEOUS at 12:13

## 2018-06-09 NOTE — DISCHARGE INSTRUCTIONS

## 2018-06-09 NOTE — TELEPHONE ENCOUNTER
Called by Dr. Candice Barney. Message from answering service. Severe right frontal headache and elevated 's at home. I spoke to the patient directly at 10:34AM.  She is currently on her way to the emergency room [facility]. This patient has a healing spontaneous right internal carotid dissection (cervical, no intracranial) complicated by Lorrie's. There was no evidence of stroke on her inpatient MRI at the time of the initial presentation.

## 2018-06-09 NOTE — PROGRESS NOTES
NEUROINTERVENTIONAL SURGERY ATTENDING NOTE    Patient well known to the service as she has been followed by Dr. Albert Maddox for a spontaneous right ICA dissection. Developed severe headache early this morning with elevated blood pressure by her report to 160/124. Has known history of migraine headache but states this is different type of headache. She was sent to the [facility] ED for further evaluation. I reviewed today's head CT and CTA and compared them to the previous studies. 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  The most critical reduction in caliber is at the skul base just proximal to its entrance into the carotid canal.  On today's study the vessel caliber is improved at this location. Otherwise, no change in the cervical portion of the right ICA is seen. The intracranial segments of the TATO are normal, with no evidence of intracranial extension of the dissection. The left cervical ICA is without dissection of stenosis. No intracranial thrombus or embolus. 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 No other intracranial vascular abnormalities identified. The head CT is negative for SAH/ICH or dural sinus thrombosis. I see no imaging findings to indicate that her headache is from worsening dissection or other structural abnormality. Would recommend treatment of her headache using migraine protocol and blood presure control. Please feel free to call me at [phone] with any questions. The patient should call our office on Monday at [phone] to schedule a follow up appointment. Omega Becker M.D.   Attending, NIS

## 2018-06-09 NOTE — ED PROVIDER NOTES
HPI Comments: 41 y.o. female with past medical history significant for anxiety, depression, high cholesterol, lorrie's syndrome, and migraines with surgical history s/p thyroidectomy, and cholecystectomy who presents from a private vehicle with chief complaint of headache. Pt had a right carotid dissection 1 month ago and was put on Eliquis and Aspirin. Pt had a CTA 1.5 weeks ago and it looked a lot better, opening up more. Pt states this morning (3 AM) she started having a \"severe\" right sided headache and HTN. Pt states she regularly has migraines, but that this headache \"feels different. \" Dr. Tabatha Gomes advised her to come to the ED. Pt denies any other symptoms. There are no other acute medical concerns at this time. Social hx: Former smoker. PCP: Khadijah De Souza MD    Note written by levi Carrasco, as dictated by Avinash Martins MD 11:05 AM            The history is provided by the patient. No  was used. Past Medical History:   Diagnosis Date    Elevated cholesterol     Lorrie's syndrome     Migraine     Psychiatric disorder     depression, anxiety       Past Surgical History:   Procedure Laterality Date    HX CHOLECYSTECTOMY      HX THYROIDECTOMY           History reviewed. No pertinent family history. Social History     Social History    Marital status:      Spouse name: N/A    Number of children: N/A    Years of education: N/A     Occupational History    Not on file. Social History Main Topics    Smoking status: Former Smoker    Smokeless tobacco: Never Used    Alcohol use No    Drug use: No    Sexual activity: Not on file     Other Topics Concern    Not on file     Social History Narrative         ALLERGIES: Pcn [penicillins]    Review of Systems   Neurological: Positive for headaches (severe right sidded headache). Negative for weakness (no upper extremity weakness). All other systems reviewed and are negative.       Vitals: 06/09/18 1101   BP: (!) 137/99   Pulse: 85   Resp: 16   Temp: 97.7 °F (36.5 °C)   SpO2: 98%   Weight: 111.9 kg (246 lb 9.6 oz)   Height: 5' 6\" (1.676 m)            Physical Exam   Constitutional: She is oriented to person, place, and time. She appears well-developed and well-nourished. No distress. HENT:   Head: Normocephalic and atraumatic. Nose: Nose normal.   Mouth/Throat: Oropharynx is clear and moist.   Eyes: Conjunctivae and EOM are normal. Pupils are equal, round, and reactive to light. No scleral icterus. Neck: Normal range of motion. Neck supple. No JVD present. No tracheal deviation present. No thyromegaly present. No carotid bruits noted. Cardiovascular: Normal rate, regular rhythm, normal heart sounds and intact distal pulses. Exam reveals no gallop and no friction rub. No murmur heard. Pulmonary/Chest: Effort normal and breath sounds normal. No respiratory distress. She has no wheezes. She has no rales. She exhibits no tenderness. Abdominal: Soft. Bowel sounds are normal. She exhibits no distension and no mass. There is no tenderness. There is no rebound and no guarding. Musculoskeletal: Normal range of motion. She exhibits no edema or tenderness. Lymphadenopathy:     She has no cervical adenopathy. Neurological: She is alert and oriented to person, place, and time. She has normal reflexes. No cranial nerve deficit. Coordination normal.   no focal or neurological deficits. No protonator drift. No aphasia. No decreased  strength. Skin: Skin is warm and dry. No rash noted. No erythema. Psychiatric: She has a normal mood and affect. Her behavior is normal. Judgment and thought content normal.   Nursing note and vitals reviewed.      Note written by levi Dsouza, as dictated by Cata Angeles MD 11:05 AM      MDM  Number of Diagnoses or Management Options  Nonintractable headache, unspecified chronicity pattern, unspecified headache type:      Amount and/or Complexity of Data Reviewed  Clinical lab tests: ordered and reviewed  Tests in the radiology section of CPT®: ordered and reviewed  Tests in the medicine section of CPT®: ordered and reviewed  Discussion of test results with the performing providers: yes  Obtain history from someone other than the patient: yes    Total critical care time spent exclusive of procedures: 35 minutes      ED Course       Procedures    PROGRESS NOTE:  2:20 PM  Spoke with Dr. Darryle Slater who reviewed films and thinks that Pt is ok to go home, believing that it is probably a migraine type headache with close follow-up with them.

## 2018-06-09 NOTE — ED TRIAGE NOTES
TRIAGE NOTE: Patient reports right sided headache that began around 0300 with BP at home of 174/124. Patient reports taking hydrocodone without relief. Hx of migraines, but \"this doesn't feel the same\". Denies dizziness, photophobia, numbness/tingling.

## 2018-06-09 NOTE — ED NOTES
10:58 AM  I have evaluated the patient as the Provider in Triage. I have reviewed Her vital signs and the triage nurse assessment. I have talked with the patient and any available family and advised that I am the provider in triage and have ordered the appropriate study to initiate their work up based on the clinical presentation during my assessment. I have advised that the patient will be accommodated in the Main ED as soon as possible. I have also requested to contact the triage nurse or myself immediately if the patient experiences any changes in their condition during this brief waiting period. Pt here for \"severe\" right sided headache and hypertension - was advised to come in by Dr. Cameron Brink - was recently admitted for Lorrie Syndrome and found to have near complete occlusion of the right carotid artery.   HA started around [address], [place]

## 2018-06-09 NOTE — ED NOTES
1120: Pt alert and oriented x4. Pt complains of a right-sided, sharp head pain that's rated as a 8/10. See MAR. VSS.     1349: Pt remains alert and oriented. Pt states that pain has decreased to a 1/10. VSS. Visit Vitals    /54 (BP 1 Location: Right arm)    Pulse 66    Temp 97.6 °F (36.4 °C)    Resp 12    Ht 5' 6\" (1.676 m)    Wt 111.9 kg (246 lb 9.6 oz)    SpO2 91%    BMI 39.8 kg/m2     1349: Patient verbalizes understanding of discharge instructions. Opportunity for questions provided. Patient in no apparent distress, VSS. Patient ambulatory upon discharge.

## 2018-07-12 ENCOUNTER — TELEPHONE (OUTPATIENT)
Dept: NEUROSURGERY | Age: 48
End: 2018-07-12

## 2018-07-12 NOTE — TELEPHONE ENCOUNTER
I spoke to Mrs. Wolff. CTA neck approved after peer to peer. #[id number]    Approval good thru August 8, 2018. I prefer the study be completed as close to August 8 as possible. I'll see her in the office after to assess the TATO dissection.       MEHDI

## 2018-08-07 ENCOUNTER — HOSPITAL ENCOUNTER (OUTPATIENT)
Dept: CT IMAGING | Age: 48
Discharge: HOME | End: 2018-08-07
Attending: RADIOLOGY
Payer: COMMERCIAL

## 2018-08-07 DIAGNOSIS — I77.71 DISSECTION OF CAROTID ARTERY (HCC): ICD-10-CM

## 2018-08-07 PROCEDURE — 74011000258 HC RX REV CODE- 258: Performed by: RADIOLOGY

## 2018-08-07 PROCEDURE — 74011636320 HC RX REV CODE- 636/320: Performed by: RADIOLOGY

## 2018-08-07 PROCEDURE — 70498 CT ANGIOGRAPHY NECK: CPT

## 2018-08-07 RX ORDER — SODIUM CHLORIDE 0.9 % (FLUSH) 0.9 %
10 SYRINGE (ML) INJECTION
Status: COMPLETED | OUTPATIENT
Start: 2018-08-07 | End: 2018-08-07

## 2018-08-07 RX ADMIN — IOPAMIDOL 100 ML: 755 INJECTION, SOLUTION INTRAVENOUS at 11:54

## 2018-08-07 RX ADMIN — SODIUM CHLORIDE 100 ML: 900 INJECTION, SOLUTION INTRAVENOUS at 11:54

## 2018-08-07 RX ADMIN — Medication 10 ML: at 11:54

## 2018-08-20 ENCOUNTER — OFFICE VISIT (OUTPATIENT)
Dept: NEUROSURGERY | Age: 48
End: 2018-08-20

## 2018-08-20 VITALS
BODY MASS INDEX: 41.32 KG/M2 | WEIGHT: 248 LBS | HEART RATE: 98 BPM | RESPIRATION RATE: 18 BRPM | OXYGEN SATURATION: 98 % | HEIGHT: 65 IN | TEMPERATURE: 98.5 F | DIASTOLIC BLOOD PRESSURE: 64 MMHG | SYSTOLIC BLOOD PRESSURE: 138 MMHG

## 2018-08-20 DIAGNOSIS — G90.2 HORNER SYNDROME: ICD-10-CM

## 2018-08-20 DIAGNOSIS — I77.71 CAROTID ARTERY DISSECTION (HCC): Primary | ICD-10-CM

## 2018-08-20 PROBLEM — E66.01 OBESITY, MORBID (HCC): Status: ACTIVE | Noted: 2018-08-20

## 2018-08-20 RX ORDER — ASPIRIN 325 MG
325 TABLET ORAL DAILY
Qty: 30 TAB | Refills: 0 | Status: SHIPPED | OUTPATIENT
Start: 2018-08-20 | End: 2021-01-21

## 2018-08-20 RX ORDER — AMITRIPTYLINE HYDROCHLORIDE 25 MG/1
25 TABLET, FILM COATED ORAL
Refills: 3 | COMMUNITY
Start: 2018-07-24

## 2018-08-20 NOTE — MR AVS SNAPSHOT
[address] [address] 
[phone] Patient: Ilia Qureshi MRN: [id number] Stacie Acuna Visit Information Date & Time Provider Department Dept. Phone Encounter #  
 8/20/2018  3:00 PM Gail Candelario MD [facility] Neurointerventional Surgery [phone] Follow-up Instructions Return if symptoms worsen or fail to improve. Upcoming Health Maintenance Date Due DTaP/Tdap/Td series (1 - Tdap) 8/11/1993 PAP AKA CERVICAL CYTOLOGY 8/11/1993 Influenza Age 5 to Adult 8/1/2018 Allergies as of 8/20/2018  Review Complete On: 8/20/2018 By: Araceli Piña CNA Severity Noted Reaction Type Reactions Pcn [Penicillins]  05/14/2018    Swelling 5/14/18: Swelling, itching (childhood) Pcn [Penicillins]  06/05/2018    Itching Current Immunizations  Never Reviewed No immunizations on file. Not reviewed this visit You Were Diagnosed With   
  
 Codes Comments Carotid artery dissection (HCC)    -  Primary ICD-10-CM: I77.71 ICD-9-CM: 443.21 Lorrie syndrome     ICD-10-CM: G90.2 ICD-9-CM: 337.9 Vitals BP Pulse Temp Resp Height(growth percentile) Weight(growth percentile)  
 138/64 (BP 1 Location: Left arm, BP Patient Position: Sitting) 98 98.5 °F (36.9 °C) (Oral) 18 5' 5\" (1.651 m) 248 lb (112.5 kg) SpO2 BMI OB Status Smoking Status 98% 41.27 kg/m2 IUD Former Smoker BMI and BSA Data Body Mass Index Body Surface Area  
 41.27 kg/m 2 2.27 m 2 Your Updated Medication List  
  
   
This list is accurate as of 8/20/18  3:33 PM.  Always use your most recent med list.  
  
  
  
  
 * ALPRAZolam 0.5 mg tablet Commonly known as:  Carletha Puller Take  by mouth two (2) times daily as needed for Anxiety. * XANAX 0.5 mg tablet Generic drug:  ALPRAZolam  
Take  by mouth. amitriptyline 25 mg tablet Commonly known as:  ELAVIL 25 mg nightly. * aspirin 81 mg chewable tablet Take 81 mg by mouth daily. * aspirin 81 mg chewable tablet Take 1 Tab by mouth daily. cyclobenzaprine 10 mg tablet Commonly known as:  FLEXERIL  
TAKE 1 TABLET BY MOUTH THREE TIMES DAILY AS NEEDED FOR BACK PAIN  
  
 ELIQUIS 5 mg tablet Generic drug:  apixaban TAKE 1 TABLET BY MOUTH TWICE A DAY  
  
 * famotidine 20 mg tablet Commonly known as:  PEPCID Take 1 Tab by mouth every twelve (12) hours. * famotidine 20 mg tablet Commonly known as:  PEPCID  
TAKE 1 TABLET BY MOUTH EVERY 12 HOURS HYDROmorphone 2 mg tablet Commonly known as:  DILAUDID Take 1 Tab by mouth every four (4) hours as needed for Pain. Max Daily Amount: 12 mg.  
  
 ibuprofen 400 mg tablet Commonly known as:  MOTRIN Take 400 mg by mouth daily. * LIPITOR 10 mg tablet Generic drug:  atorvastatin Take  by mouth daily. * LIPITOR 10 mg tablet Generic drug:  atorvastatin Take  by mouth daily. oxyCODONE-acetaminophen 5-325 mg per tablet Commonly known as:  PERCOCET Take 1 Tab by mouth every eight (8) hours as needed. Max Daily Amount: 3 Tabs. * sertraline 100 mg tablet Commonly known as:  ZOLOFT Take 100 mg by mouth daily. * sertraline 100 mg tablet Commonly known as:  ZOLOFT Take  by mouth daily. * UNITHROID 150 mcg tablet Generic drug:  levothyroxine Take  by mouth Daily (before breakfast). * UNITHROID 150 mcg tablet Generic drug:  levothyroxine Take  by mouth Daily (before breakfast). VITAMIN D2 PO Take 1.25 ng by mouth every seven (7) days. * Notice: This list has 12 medication(s) that are the same as other medications prescribed for you. Read the directions carefully, and ask your doctor or other care provider to review them with you. Follow-up Instructions Return if symptoms worsen or fail to improve. Patient Instructions Your right carotid artery dissection has essentially healed with no complications. Discontinue Eliquis (blood thinner) immediately. Increase your daily aspirin dose to 325 mg for one year, then reduce to a baby aspirin (81 mg) and continue for life. Your are at increased risk for recurrent dissection and stroke. Call 911 if you experience any stroke symptoms. Remember to BE FAST. You are released from Dr. Obdulia Garcia care today. Followup with routine eye exams (Lorrie's) and primary care visits. No further imaging is indicated unless new symptoms occur. It has been a pleasure to care for you. Do not hesitate to call our office with questions or concerns in the future. [phone] Introducing [facility]! Elvira Crook introduces [employer] patient portal. Now you can access parts of your medical record, email your doctor's office, and request medication refills online. 1. In your internet browser, go to [email or web address] 2. Click on the First Time User? Click Here link in the Sign In box. You will see the New Member Sign Up page. 3. Enter your [employer] Access Code exactly as it appears below. You will not need to use this code after youve completed the sign-up process. If you do not sign up before the expiration date, you must request a new code. · [employer] Access Code: [email or web address] Expires: 8/22/2018 12:06 PM 
 
4. Enter the last four digits of your Social Security Number (xxxx) and Date of Birth (mm/dd/yyyy) as indicated and click Submit. You will be taken to the next sign-up page. 5. Create a [employer] ID. This will be your [employer] login ID and cannot be changed, so think of one that is secure and easy to remember. 6. Create a [employer] password. You can change your password at any time. 7. Enter your Password Reset Question and Answer. This can be used at a later time if you forget your password. 8. Enter your e-mail address. You will receive e-mail notification when new information is available in [address] 9. Click Sign Up. You can now view and download portions of your medical record. 10. Click the Download Summary menu link to download a portable copy of your medical information. If you have questions, please visit the Frequently Asked Questions section of the [employer] website. Remember, [employer] is NOT to be used for urgent needs. For medical emergencies, dial 911. Now available from your iPhone and Android! Please provide this summary of care documentation to your next provider. Your primary care clinician is listed as Isabella Vail. If you have any questions after today's visit, please call [phone].

## 2018-08-20 NOTE — PROGRESS NOTES
Follow up for headache and neck pain. Patient reports no headache, neck pain or visual problems since starting Elavil a month ago. No acute problems reported.

## 2018-08-20 NOTE — PROGRESS NOTES
Neurointerventional Surgery  Ambulatory Progress Note      Patient: Cass Baumann MRN: [id number]  SSN: [id number]    YOB: 1970  Age: 57 y.o. Sex: female      History of Present Illness:     Estela Patiño presents today for TATO dissection clinical followup and CTA imaging review. Right sided Lorrie's is improving. Denies headaches or neck pain. Does not endorse any visual or neurological symptoms except for the Horners. 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 Objective:     Current Outpatient Prescriptions   Medication Sig Dispense Refill    ergocalciferol, vitamin D2, (VITAMIN D2 PO) Take 1.25 ng by mouth every seven (7) days.  amitriptyline (ELAVIL) 25 mg tablet 25 mg nightly. 3    cyclobenzaprine (FLEXERIL) 10 mg tablet TAKE 1 TABLET BY MOUTH THREE TIMES DAILY AS NEEDED FOR BACK PAIN  3    aspirin 81 mg chewable tablet Take 1 Tab by mouth daily. 30 Tab 0    ALPRAZolam (XANAX) 0.5 mg tablet Take  by mouth two (2) times daily as needed for Anxiety.  levothyroxine (UNITHROID) 150 mcg tablet Take  by mouth Daily (before breakfast).  sertraline (ZOLOFT) 100 mg tablet Take 100 mg by mouth daily.  atorvastatin (LIPITOR) 10 mg tablet Take  by mouth daily.  ibuprofen (MOTRIN) 400 mg tablet Take 400 mg by mouth daily.  HYDROmorphone (DILAUDID) 2 mg tablet Take 1 Tab by mouth every four (4) hours as needed for Pain. Max Daily Amount: 12 mg. 8 Tab 0    ELIQUIS 5 mg tablet TAKE 1 TABLET BY MOUTH TWICE A DAY  0    famotidine (PEPCID) 20 mg tablet TAKE 1 TABLET BY MOUTH EVERY 12 HOURS  0    atorvastatin (LIPITOR) 10 mg tablet Take  by mouth daily.       sertraline (ZOLOFT) 100 mg tablet Take  by mouth daily.      ALPRAZolam (XANAX) 0.5 mg tablet Take  by mouth.  aspirin 81 mg chewable tablet Take 81 mg by mouth daily.  levothyroxine (UNITHROID) 150 mcg tablet Take  by mouth Daily (before breakfast).  famotidine (PEPCID) 20 mg tablet Take 1 Tab by mouth every twelve (12) hours. 60 Tab 0    oxyCODONE-acetaminophen (PERCOCET) 5-325 mg per tablet Take 1 Tab by mouth every eight (8) hours as needed. Max Daily Amount: 3 Tabs. 20 Tab 0        Visit Vitals    /64 (BP 1 Location: Left arm, BP Patient Position: Sitting)    Pulse 98    Temp 98.5 °F (36.9 °C) (Oral)    Resp 18    Ht 5' 5\" (1.651 m)    Wt 248 lb (112.5 kg)    SpO2 98%    BMI 41.27 kg/m2       Allergies   Allergen Reactions    Pcn [Penicillins] Swelling     5/14/18: Swelling, itching (childhood)    Pcn [Penicillins] Itching       Current Outpatient Prescriptions   Medication Sig    ergocalciferol, vitamin D2, (VITAMIN D2 PO) Take 1.25 ng by mouth every seven (7) days.  amitriptyline (ELAVIL) 25 mg tablet 25 mg nightly.  cyclobenzaprine (FLEXERIL) 10 mg tablet TAKE 1 TABLET BY MOUTH THREE TIMES DAILY AS NEEDED FOR BACK PAIN    aspirin 81 mg chewable tablet Take 1 Tab by mouth daily.  ALPRAZolam (XANAX) 0.5 mg tablet Take  by mouth two (2) times daily as needed for Anxiety.  levothyroxine (UNITHROID) 150 mcg tablet Take  by mouth Daily (before breakfast).  sertraline (ZOLOFT) 100 mg tablet Take 100 mg by mouth daily.  atorvastatin (LIPITOR) 10 mg tablet Take  by mouth daily.  ibuprofen (MOTRIN) 400 mg tablet Take 400 mg by mouth daily.  HYDROmorphone (DILAUDID) 2 mg tablet Take 1 Tab by mouth every four (4) hours as needed for Pain. Max Daily Amount: 12 mg.    ELIQUIS 5 mg tablet TAKE 1 TABLET BY MOUTH TWICE A DAY    famotidine (PEPCID) 20 mg tablet TAKE 1 TABLET BY MOUTH EVERY 12 HOURS    atorvastatin (LIPITOR) 10 mg tablet Take  by mouth daily.     sertraline (ZOLOFT) 100 mg tablet Take  by mouth daily.    ALPRAZolam (XANAX) 0.5 mg tablet Take  by mouth.  aspirin 81 mg chewable tablet Take 81 mg by mouth daily.  levothyroxine (UNITHROID) 150 mcg tablet Take  by mouth Daily (before breakfast).  famotidine (PEPCID) 20 mg tablet Take 1 Tab by mouth every twelve (12) hours.  oxyCODONE-acetaminophen (PERCOCET) 5-325 mg per tablet Take 1 Tab by mouth every eight (8) hours as needed. Max Daily Amount: 3 Tabs. No current facility-administered medications for this visit. Physical Exam:  General: NAD  Lungs: clear to auscultation bilaterally  Heart: regular rate and rhythm, S1, S2 normal, no murmur, click, rub or gallop  Extremities: extremities normal, atraumatic, no cyanosis or edema  Pulses: 2+ and symmetric    Neurologic Exam:  Mental Status:  Alert and oriented x 4. Appropriate affect, mood and behavior. Language:    Normal fluency, repetition, comprehension and naming. Cranial Nerves:   Right pupil 2 mm reactive. Left pupil 3 mm reactive. Visual fields full to confrontation. Extraocular movements intact. Facial sensation intact V1 - V3. Full facial strength, no asymmetry. Hearing intact bilaterally. No dysarthria. Tongue protrudes to midline, palate elevates symmetrically. Shoulder shrug 5/5 bilaterally. Motor:    No pronator drift. Bulk and tone normal.      5/5 power in all extremities proximally and distally. No involuntary movements. Sensation:    Sensation intact throughout to light touch. Coordination & Gait: Tandem gait normal, FTN intact.         Labs:  Lab Results   Component Value Date/Time    Sodium 139 05/16/2018 04:02 AM    Potassium 4.0 05/16/2018 04:02 AM    Chloride 104 05/16/2018 04:02 AM    CO2 28 05/16/2018 04:02 AM    Anion gap 7 05/16/2018 04:02 AM    Glucose 99 05/16/2018 04:02 AM    BUN 11 05/16/2018 04:02 AM    Creatinine 0.88 05/16/2018 04:02 AM    BUN/Creatinine ratio 13 05/16/2018 04:02 AM    GFR est AA >60 05/16/2018 04:02 AM    GFR est non-AA >60 05/16/2018 04:02 AM    Calcium 8.1 (L) 05/16/2018 04:02 AM     Lab Results   Component Value Date/Time    WBC 8.1 06/09/2018 11:22 AM    HGB 11.9 06/09/2018 11:22 AM    Hematocrit (POC) 36 06/09/2018 11:22 AM    HCT 37.4 06/09/2018 11:22 AM    PLATELET 061 96/83/8857 11:22 AM    MCV 91.7 06/09/2018 11:22 AM     Lab Results   Component Value Date/Time    MCH 29.2 06/09/2018 11:22 AM    MCHC 31.8 06/09/2018 11:22 AM    BASOPHILS 1 06/09/2018 11:22 AM    ABS. LYMPHOCYTES 3.0 06/09/2018 11:22 AM    ABS. MONOCYTES 0.5 06/09/2018 11:22 AM    ABS. EOSINOPHILS 0.1 06/09/2018 11:22 AM    ABS. BASOPHILS 0.1 06/09/2018 11:22 AM       IMAGING:      This example would display results for the last three orders with an external procedure ID of [id number]. Cta Neck    Result Date: 8/8/2018  IMPRESSION: Continued evolution and resolution of distal right cervical internal carotid artery dissection. Assessment/Plan:       ICD-10-CM ICD-9-CM    1. Carotid artery dissection (HCC) I77.71 443.21    2. 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 Patient is in agreement. The patient has received an after-visit summary and questions were answered concerning future plans. I have discussed medication side effects and warnings with the patient as well.       Arnoldo Joseph MD

## 2018-08-20 NOTE — PATIENT INSTRUCTIONS
Your right carotid artery dissection has essentially healed with no complications. Discontinue Eliquis (blood thinner) immediately. Increase your daily aspirin dose to 325 mg for one year, then reduce to a baby aspirin (81 mg) and continue for life. Your are at increased risk for recurrent dissection and stroke. Call 911 if you experience any stroke symptoms. Remember to BE FAST. You are released from Dr. Carla ashraf today. Followup with routine eye exams (Lorrie's) and primary care visits. No further imaging is indicated unless new symptoms occur. It has been a pleasure to care for you. Do not hesitate to call our office with questions or concerns in the future.   [phone]

## 2019-09-26 ENCOUNTER — HOSPITAL ENCOUNTER (OUTPATIENT)
Dept: GENERAL RADIOLOGY | Age: 49
Discharge: HOME | End: 2019-09-26
Attending: PAIN MEDICINE
Payer: COMMERCIAL

## 2019-09-26 ENCOUNTER — HOSPITAL ENCOUNTER (OUTPATIENT)
Dept: MRI IMAGING | Age: 49
Discharge: HOME | End: 2019-09-26
Attending: PAIN MEDICINE
Payer: COMMERCIAL

## 2019-09-26 DIAGNOSIS — M51.16 INTERVERTEBRAL DISC DISORDER WITH RADICULOPATHY OF LUMBAR REGION: ICD-10-CM

## 2019-09-26 DIAGNOSIS — M46.1 SACROILIITIS, NOT ELSEWHERE CLASSIFIED (HCC): ICD-10-CM

## 2019-09-26 PROCEDURE — 72120 X-RAY BEND ONLY L-S SPINE: CPT

## 2019-09-26 PROCEDURE — 72148 MRI LUMBAR SPINE W/O DYE: CPT

## 2019-09-26 PROCEDURE — 72202 X-RAY EXAM SI JOINTS 3/> VWS: CPT

## 2019-09-26 PROCEDURE — 72100 X-RAY EXAM L-S SPINE 2/3 VWS: CPT

## 2021-01-21 ENCOUNTER — APPOINTMENT (OUTPATIENT)
Dept: CT IMAGING | Age: 51
End: 2021-01-21
Attending: EMERGENCY MEDICINE
Payer: COMMERCIAL

## 2021-01-21 ENCOUNTER — HOSPITAL ENCOUNTER (EMERGENCY)
Age: 51
Discharge: HOME | End: 2021-01-21
Attending: EMERGENCY MEDICINE
Payer: COMMERCIAL

## 2021-01-21 VITALS
RESPIRATION RATE: 19 BRPM | OXYGEN SATURATION: 95 % | HEART RATE: 82 BPM | SYSTOLIC BLOOD PRESSURE: 145 MMHG | WEIGHT: 249.78 LBS | DIASTOLIC BLOOD PRESSURE: 92 MMHG | BODY MASS INDEX: 41.57 KG/M2 | TEMPERATURE: 98.5 F

## 2021-01-21 DIAGNOSIS — J98.11 ATELECTASIS OF RIGHT LUNG: ICD-10-CM

## 2021-01-21 DIAGNOSIS — T50.905A MEDICATION SIDE EFFECT, INITIAL ENCOUNTER: ICD-10-CM

## 2021-01-21 DIAGNOSIS — J18.9 PNEUMONIA OF RIGHT LOWER LOBE DUE TO INFECTIOUS ORGANISM: ICD-10-CM

## 2021-01-21 DIAGNOSIS — R06.02 SOB (SHORTNESS OF BREATH): Primary | ICD-10-CM

## 2021-01-21 LAB
ALBUMIN SERPL-MCNC: 4 G/DL (ref 3.5–5)
ALBUMIN/GLOB SERPL: 1 {RATIO} (ref 1.1–2.2)
ALP SERPL-CCNC: 89 U/L (ref 45–117)
ALT SERPL-CCNC: 73 U/L (ref 12–78)
ANION GAP SERPL CALC-SCNC: 11 MMOL/L (ref 5–15)
AST SERPL-CCNC: 46 U/L (ref 15–37)
BASOPHILS # BLD: 0.1 K/UL (ref 0–0.1)
BASOPHILS NFR BLD: 0 % (ref 0–1)
BILIRUB SERPL-MCNC: 0.4 MG/DL (ref 0.2–1)
BNP SERPL-MCNC: 168 PG/ML (ref 0–125)
BUN SERPL-MCNC: 13 MG/DL (ref 6–20)
BUN/CREAT SERPL: 16 (ref 12–20)
CALCIUM SERPL-MCNC: 8.4 MG/DL (ref 8.5–10.1)
CHLORIDE SERPL-SCNC: 100 MMOL/L (ref 97–108)
CO2 SERPL-SCNC: 30 MMOL/L (ref 21–32)
COMMENT, HOLDF: NORMAL
CREAT SERPL-MCNC: 0.8 MG/DL (ref 0.55–1.02)
DIFFERENTIAL METHOD BLD: ABNORMAL
EOSINOPHIL # BLD: 0.2 K/UL (ref 0–0.4)
EOSINOPHIL NFR BLD: 2 % (ref 0–7)
ERYTHROCYTE [DISTWIDTH] IN BLOOD BY AUTOMATED COUNT: 12.8 % (ref 11.5–14.5)
GLOBULIN SER CALC-MCNC: 4.1 G/DL (ref 2–4)
GLUCOSE SERPL-MCNC: 99 MG/DL (ref 65–100)
HCT VFR BLD AUTO: 43.8 % (ref 35–47)
HGB BLD-MCNC: 13.9 G/DL (ref 11.5–16)
IMM GRANULOCYTES # BLD AUTO: 0.1 K/UL (ref 0–0.04)
IMM GRANULOCYTES NFR BLD AUTO: 1 % (ref 0–0.5)
LYMPHOCYTES # BLD: 3.2 K/UL (ref 0.8–3.5)
LYMPHOCYTES NFR BLD: 24 % (ref 12–49)
MCH RBC QN AUTO: 28.7 PG (ref 26–34)
MCHC RBC AUTO-ENTMCNC: 31.7 G/DL (ref 30–36.5)
MCV RBC AUTO: 90.3 FL (ref 80–99)
MONOCYTES # BLD: 0.7 K/UL (ref 0–1)
MONOCYTES NFR BLD: 5 % (ref 5–13)
NEUTS SEG # BLD: 9.2 K/UL (ref 1.8–8)
NEUTS SEG NFR BLD: 68 % (ref 32–75)
NRBC # BLD: 0 K/UL (ref 0–0.01)
NRBC BLD-RTO: 0 PER 100 WBC
PLATELET # BLD AUTO: 425 K/UL (ref 150–400)
PMV BLD AUTO: 8.5 FL (ref 8.9–12.9)
POTASSIUM SERPL-SCNC: 3.8 MMOL/L (ref 3.5–5.1)
PROT SERPL-MCNC: 8.1 G/DL (ref 6.4–8.2)
RBC # BLD AUTO: 4.85 M/UL (ref 3.8–5.2)
SAMPLES BEING HELD,HOLD: NORMAL
SODIUM SERPL-SCNC: 141 MMOL/L (ref 136–145)
TROPONIN I SERPL-MCNC: <0.05 NG/ML
WBC # BLD AUTO: 13.4 K/UL (ref 3.6–11)

## 2021-01-21 PROCEDURE — 93005 ELECTROCARDIOGRAM TRACING: CPT

## 2021-01-21 PROCEDURE — 99284 EMERGENCY DEPT VISIT MOD MDM: CPT

## 2021-01-21 PROCEDURE — 80053 COMPREHEN METABOLIC PANEL: CPT

## 2021-01-21 PROCEDURE — 85025 COMPLETE CBC W/AUTO DIFF WBC: CPT

## 2021-01-21 PROCEDURE — 83880 ASSAY OF NATRIURETIC PEPTIDE: CPT

## 2021-01-21 PROCEDURE — 74011000636 HC RX REV CODE- 636: Performed by: EMERGENCY MEDICINE

## 2021-01-21 PROCEDURE — 84484 ASSAY OF TROPONIN QUANT: CPT

## 2021-01-21 PROCEDURE — 71275 CT ANGIOGRAPHY CHEST: CPT

## 2021-01-21 RX ORDER — ACETAMINOPHEN 325 MG/1
1000 TABLET ORAL
COMMUNITY

## 2021-01-21 RX ORDER — DOXYCYCLINE HYCLATE 100 MG
100 TABLET ORAL 2 TIMES DAILY
Qty: 14 TAB | Refills: 0 | Status: SHIPPED | OUTPATIENT
Start: 2021-01-21 | End: 2021-01-28

## 2021-01-21 RX ADMIN — IOPAMIDOL 80 ML: 755 INJECTION, SOLUTION INTRAVENOUS at 15:34

## 2021-01-21 NOTE — ED TRIAGE NOTES
Triage Note: Patient reports she had shoulder surgery on Monday with Dr. Cole Hylton. Patient complains of SOB and a non-productive cough since yesterday.

## 2021-01-21 NOTE — ED PROVIDER NOTES
17-year-old female presents to the emergency department stating that on Monday she had shoulder surgery with Dr. Katherine Briones and received a nerve block for her procedure. Starting yesterday she has developed shortness of breath and intermittent nonproductive cough with a sensation that she cannot take a full breath. She states that her chest feels slightly tight but denies any significant chest pain. She denies any fever, chills, URI symptoms, myalgias, nausea, vomiting, diarrhea, or any other medical concerns. She has been keeping her shoulder in her splint as directed. She denies any leg swelling or tenderness, no history of prior DVT/PE. Past Medical History:   Diagnosis Date    Elevated cholesterol     Lorrie's syndrome     Migraine     Psychiatric disorder     depression, anxiety       Past Surgical History:   Procedure Laterality Date    HX CHOLECYSTECTOMY      HX ORTHOPAEDIC      Right Shoulder    HX THYROIDECTOMY           History reviewed. No pertinent family history.     Social History     Socioeconomic History    Marital status:      Spouse name: Not on file    Number of children: Not on file    Years of education: Not on file    Highest education level: Not on file   Occupational History    Not on file   Social Needs    Financial resource strain: Not on file    Food insecurity     Worry: Not on file     Inability: Not on file    Transportation needs     Medical: Not on file     Non-medical: Not on file   Tobacco Use    Smoking status: Former Smoker    Smokeless tobacco: Never Used   Substance and Sexual Activity    Alcohol use: No    Drug use: No    Sexual activity: Not on file   Lifestyle    Physical activity     Days per week: Not on file     Minutes per session: Not on file    Stress: Not on file   Relationships    Social connections     Talks on phone: Not on file     Gets together: Not on file     Attends Buddhist service: Not on file     Active member of club or organization: Not on file     Attends meetings of clubs or organizations: Not on file     Relationship status: Not on file    Intimate partner violence     Fear of current or ex partner: Not on file     Emotionally abused: Not on file     Physically abused: Not on file     Forced sexual activity: Not on file   Other Topics Concern    Not on file   Social History Narrative    Not on file         ALLERGIES: Pcn [penicillins] and Pcn [penicillins]    Review of Systems   Constitutional: Positive for activity change. Negative for appetite change, chills and fever. HENT: Negative for congestion, rhinorrhea, sinus pressure, sneezing and sore throat. Eyes: Negative for photophobia and visual disturbance. Respiratory: Positive for cough and shortness of breath. Cardiovascular: Negative for chest pain. Gastrointestinal: Negative for abdominal pain, blood in stool, constipation, diarrhea, nausea and vomiting. Genitourinary: Negative for difficulty urinating, dysuria, flank pain, frequency, hematuria, menstrual problem, urgency, vaginal bleeding and vaginal discharge. Musculoskeletal: Positive for arthralgias (Right shoulder status post surgery). Negative for back pain, myalgias and neck pain. Skin: Negative for rash and wound. Neurological: Negative for syncope, weakness, numbness and headaches. Psychiatric/Behavioral: Negative for self-injury and suicidal ideas. All other systems reviewed and are negative. Vitals:    01/21/21 1433 01/21/21 1500   BP: (!) 159/99 (!) 119/90   Pulse: 79 91   Resp: 15 14   Temp: 98.1 °F (36.7 °C)    SpO2: 94% 94%   Weight: 113.3 kg (249 lb 12.5 oz)             Physical Exam  Vitals signs and nursing note reviewed. Constitutional:       General: She is not in acute distress. Appearance: Normal appearance. She is well-developed. She is obese. She is not diaphoretic. HENT:      Head: Normocephalic and atraumatic.       Nose: Nose normal.   Eyes: Extraocular Movements: Extraocular movements intact. Conjunctiva/sclera: Conjunctivae normal.      Pupils: Pupils are equal, round, and reactive to light. Neck:      Musculoskeletal: Neck supple. Cardiovascular:      Rate and Rhythm: Normal rate and regular rhythm. Heart sounds: Normal heart sounds. Pulmonary:      Effort: Pulmonary effort is normal.      Breath sounds: Normal breath sounds. Abdominal:      General: There is no distension. Palpations: Abdomen is soft. Tenderness: There is no abdominal tenderness. Musculoskeletal:         General: No tenderness. Right lower leg: She exhibits no tenderness. No edema. Left lower leg: She exhibits no tenderness. No edema. Comments: Right shoulder dressed and in postop brace. Skin:     General: Skin is warm and dry. Neurological:      General: No focal deficit present. Mental Status: She is alert and oriented to person, place, and time. Cranial Nerves: No cranial nerve deficit. Sensory: No sensory deficit. Motor: No weakness. Coordination: Coordination normal.          MDM   44-year-old female with recent shoulder surgery presents with shortness of breath and intermittent cough. Patient is afebrile vital signs stable satting normally on room air in no respiratory distress. Given recent operative management concern for possible PE or possible pneumonia secondary to aspiration during procedure. Low suspicion for COVID-19 infection given no fever and other vital signs stable, and no other viral type symptoms. Labs returned showing WBC 13.4, otherwise reassuring. Negative troponin. CTA chest shows no visualized PE does show atelectasis versus consolidation in right lower lobe with an elevated right hemidiaphragm. Feel that this diaphragmatic abnormality may possibly be secondary to medication side effect from nerve block for her recent shoulder surgery, versus pneumonia.     She was given Rx for doxycycline to treat possible infectious etiology but if due to nerve block symptoms will hopefully gradually resolve as the medication wears off. Recommended orthopedic follow-up for further evaluation as needed and return precautions were given for worsening or concerns. This plan was discussed with the patient at the bedside and she stated both understanding and agreement. Procedures      1433 EKG shows normal sinus rhythm with a rate of 78 bpm with T wave inversion anteriorly no ST elevation or depression.

## 2021-01-22 LAB
ATRIAL RATE: 78 BPM
CALCULATED P AXIS, ECG09: 43 DEGREES
CALCULATED R AXIS, ECG10: -3 DEGREES
CALCULATED T AXIS, ECG11: 55 DEGREES
DIAGNOSIS, 93000: NORMAL
P-R INTERVAL, ECG05: 158 MS
Q-T INTERVAL, ECG07: 344 MS
QRS DURATION, ECG06: 72 MS
QTC CALCULATION (BEZET), ECG08: 392 MS
VENTRICULAR RATE, ECG03: 78 BPM

## 2022-03-18 PROBLEM — G90.2 HORNER SYNDROME: Status: ACTIVE | Noted: 2018-05-14

## 2022-03-19 PROBLEM — E66.01 OBESITY, MORBID (HCC): Status: ACTIVE | Noted: 2018-08-20

## 2022-03-19 PROBLEM — I77.71 DISSECTION OF CAROTID ARTERY (HCC): Status: ACTIVE | Noted: 2018-06-05

## 2022-03-19 PROBLEM — I77.71 CAROTID ARTERY DISSECTION (HCC): Status: ACTIVE | Noted: 2018-05-15

## 2023-06-09 ENCOUNTER — HOSPITAL ENCOUNTER (OUTPATIENT)
Facility: HOSPITAL | Age: 53
Discharge: HOME | End: 2023-06-09
Payer: COMMERCIAL

## 2023-06-09 VITALS
SYSTOLIC BLOOD PRESSURE: 118 MMHG | HEART RATE: 81 BPM | BODY MASS INDEX: 38.97 KG/M2 | DIASTOLIC BLOOD PRESSURE: 75 MMHG | OXYGEN SATURATION: 98 % | HEIGHT: 66 IN | RESPIRATION RATE: 16 BRPM | TEMPERATURE: 97.3 F | WEIGHT: 242.5 LBS

## 2023-06-09 LAB
ALBUMIN SERPL-MCNC: 3.9 G/DL (ref 3.5–5)
ALBUMIN/GLOB SERPL: 1 (ref 1.1–2.2)
ALP SERPL-CCNC: 76 U/L (ref 45–117)
ALT SERPL-CCNC: 29 U/L (ref 12–78)
ANION GAP SERPL CALC-SCNC: 5 MMOL/L (ref 5–15)
APTT PPP: 27.3 SEC (ref 22.1–31)
AST SERPL-CCNC: 14 U/L (ref 15–37)
BASOPHILS # BLD: 0.1 K/UL (ref 0–0.1)
BASOPHILS NFR BLD: 1 % (ref 0–1)
BILIRUB SERPL-MCNC: 0.4 MG/DL (ref 0.2–1)
BUN SERPL-MCNC: 14 MG/DL (ref 6–20)
BUN/CREAT SERPL: 18 (ref 12–20)
CALCIUM SERPL-MCNC: 8.9 MG/DL (ref 8.5–10.1)
CHLORIDE SERPL-SCNC: 107 MMOL/L (ref 97–108)
CO2 SERPL-SCNC: 29 MMOL/L (ref 21–32)
CREAT SERPL-MCNC: 0.76 MG/DL (ref 0.55–1.02)
DIFFERENTIAL METHOD BLD: ABNORMAL
EKG ATRIAL RATE: 78 BPM
EKG DIAGNOSIS: NORMAL
EKG P AXIS: 51 DEGREES
EKG P-R INTERVAL: 156 MS
EKG Q-T INTERVAL: 396 MS
EKG QRS DURATION: 80 MS
EKG QTC CALCULATION (BAZETT): 451 MS
EKG R AXIS: 1 DEGREES
EKG T AXIS: 22 DEGREES
EKG VENTRICULAR RATE: 78 BPM
EOSINOPHIL # BLD: 0.1 K/UL (ref 0–0.4)
EOSINOPHIL NFR BLD: 1 % (ref 0–7)
ERYTHROCYTE [DISTWIDTH] IN BLOOD BY AUTOMATED COUNT: 12.1 % (ref 11.5–14.5)
GLOBULIN SER CALC-MCNC: 3.8 G/DL (ref 2–4)
GLUCOSE SERPL-MCNC: 94 MG/DL (ref 65–100)
HCT VFR BLD AUTO: 43 % (ref 35–47)
HGB BLD-MCNC: 13.9 G/DL (ref 11.5–16)
IMM GRANULOCYTES # BLD AUTO: 0 K/UL (ref 0–0.04)
IMM GRANULOCYTES NFR BLD AUTO: 1 % (ref 0–0.5)
INR PPP: 1 (ref 0.9–1.1)
LYMPHOCYTES # BLD: 2.6 K/UL (ref 0.8–3.5)
LYMPHOCYTES NFR BLD: 33 % (ref 12–49)
MCH RBC QN AUTO: 28.7 PG (ref 26–34)
MCHC RBC AUTO-ENTMCNC: 32.3 G/DL (ref 30–36.5)
MCV RBC AUTO: 88.7 FL (ref 80–99)
MONOCYTES # BLD: 0.5 K/UL (ref 0–1)
MONOCYTES NFR BLD: 6 % (ref 5–13)
NEUTS SEG # BLD: 4.6 K/UL (ref 1.8–8)
NEUTS SEG NFR BLD: 58 % (ref 32–75)
NRBC # BLD: 0 K/UL (ref 0–0.01)
NRBC BLD-RTO: 0 PER 100 WBC
PLATELET # BLD AUTO: 344 K/UL (ref 150–400)
PMV BLD AUTO: 9.5 FL (ref 8.9–12.9)
POTASSIUM SERPL-SCNC: 4.6 MMOL/L (ref 3.5–5.1)
PROT SERPL-MCNC: 7.7 G/DL (ref 6.4–8.2)
PROTHROMBIN TIME: 10.3 SEC (ref 9–11.1)
RBC # BLD AUTO: 4.85 M/UL (ref 3.8–5.2)
SODIUM SERPL-SCNC: 141 MMOL/L (ref 136–145)
THERAPEUTIC RANGE: NORMAL SECS (ref 58–77)
WBC # BLD AUTO: 7.9 K/UL (ref 3.6–11)

## 2023-06-09 PROCEDURE — 36415 COLL VENOUS BLD VENIPUNCTURE: CPT

## 2023-06-09 PROCEDURE — 80053 COMPREHEN METABOLIC PANEL: CPT

## 2023-06-09 PROCEDURE — 85025 COMPLETE CBC W/AUTO DIFF WBC: CPT

## 2023-06-09 PROCEDURE — 85610 PROTHROMBIN TIME: CPT

## 2023-06-09 PROCEDURE — 85730 THROMBOPLASTIN TIME PARTIAL: CPT

## 2023-06-09 RX ORDER — PHENTERMINE HYDROCHLORIDE 15 MG/1
CAPSULE ORAL
COMMUNITY
Start: 2023-04-24

## 2023-06-09 RX ORDER — ALPRAZOLAM 0.25 MG/1
TABLET ORAL
COMMUNITY
Start: 2023-04-20

## 2023-06-09 RX ORDER — LEVOTHYROXINE SODIUM 300 UG/1
300 TABLET ORAL
COMMUNITY

## 2023-06-09 RX ORDER — PHENOL 1.4 %
1 AEROSOL, SPRAY (ML) MUCOUS MEMBRANE AS NEEDED
COMMUNITY

## 2023-06-09 NOTE — PERIOP NOTE
Pt aware not to take phentermine 10 days prior to surgery and has stopped on 6/6/23.
Pt reports that she attempted twice to void and could not.  
Received verbal report from OXANA Echavarria.  Pt in ED room ED 02/ED 02A lying in stretcher, HOB 30 degrees. Stretcher is in low, locked position, side rails up x2. The patient is awake, alert and cooperative with a calm affect, patient is aware of environment. Airway is open and patent, respirations are spontaneous, normal respiratory effort and rate noted, skin warm and dry, full ROM in all extremities, appearance: NAD noted, resting comfortably.Call bell within reach of pt, pt instructed on use, pt verbalizes understanding of call bell use. Hourly rounding explained and white board updated.  Plan of care: family to bedside, observe and reassure, position of comfort, respirations even and unlabored, patient offers no complaints at this time, awaiting additional orders, will continue to monitor    
Chlorhexidine [employer] body wash instructions provided to you during PAT appointment. Begin 3 days prior to surgery. Do not shave or trim anywhere 24 hours before surgery  Wear your hair loose or down; no pony-tails, buns, or metal hair clips  Wear loose, comfortable, clean clothes  Wear glasses instead of contacts  Leave money, valuables, and jewelry, including body piercings, at home  If you were given an [employer], bring it on day of surgery. Going Home - or Spending the Night SAME-DAY SURGERY: You must have a responsible adult drive you home and stay with you 24 hours after surgery  ADMITS: If your doctor is keeping you in the hospital after surgery, leave personal belongings/luggage in your car until you have a hospital room number. Hospital discharge time is 12 noon  Drivers must be here before 12 noon unless you are told differently   Special Instructions DO NO TAKE PHENETRMINE UNTIL AFTER SURGERY AS REPORTED HAS ALREADY STOPPED AS OF 6/6       Follow all instructions so your surgery wont be cancelled. Please, be on time. If a situation occurs and you are delayed the day of surgery, call [phone] or [phone]. If your physical condition changes (like a fever, cold, flu, etc.) call your surgeon. Home medication(s) reviewed and verified via    LIST   VERBAL   during PAT appointment. The patient was contacted by  IN-PERSON  The patient verbalizes understanding of all instructions and   DOES NOT   need reinforcement.

## 2023-06-15 ENCOUNTER — HOSPITAL ENCOUNTER (OUTPATIENT)
Facility: HOSPITAL | Age: 53
Setting detail: OUTPATIENT SURGERY
Discharge: HOME | End: 2023-06-15
Attending: SURGERY | Admitting: SURGERY
Payer: COMMERCIAL

## 2023-06-15 VITALS
RESPIRATION RATE: 10 BRPM | DIASTOLIC BLOOD PRESSURE: 60 MMHG | WEIGHT: 241.84 LBS | BODY MASS INDEX: 38.87 KG/M2 | OXYGEN SATURATION: 96 % | HEART RATE: 89 BPM | HEIGHT: 66 IN | TEMPERATURE: 97.6 F | SYSTOLIC BLOOD PRESSURE: 104 MMHG

## 2023-06-15 LAB — HCG UR QL: NEGATIVE

## 2023-06-15 PROCEDURE — 88305 TISSUE EXAM BY PATHOLOGIST: CPT

## 2023-06-15 PROCEDURE — 7100000010 HC PHASE II RECOVERY - FIRST 15 MIN: Performed by: SURGERY

## 2023-06-15 PROCEDURE — 3600000014 HC SURGERY LEVEL 4 ADDTL 15MIN: Performed by: SURGERY

## 2023-06-15 PROCEDURE — 7100000000 HC PACU RECOVERY - FIRST 15 MIN: Performed by: SURGERY

## 2023-06-15 PROCEDURE — A4217 STERILE WATER/SALINE, 500 ML: HCPCS | Performed by: SURGERY

## 2023-06-15 PROCEDURE — 7100000001 HC PACU RECOVERY - ADDTL 15 MIN: Performed by: SURGERY

## 2023-06-15 PROCEDURE — 2580000003 HC RX 258: Performed by: SURGERY

## 2023-06-15 PROCEDURE — 6360000002 HC RX W HCPCS: Performed by: SURGERY

## 2023-06-15 PROCEDURE — 2709999900 HC NON-CHARGEABLE SUPPLY: Performed by: SURGERY

## 2023-06-15 PROCEDURE — 3700000001 HC ADD 15 MINUTES (ANESTHESIA): Performed by: SURGERY

## 2023-06-15 PROCEDURE — 2580000003 HC RX 258: Performed by: ANESTHESIOLOGY

## 2023-06-15 PROCEDURE — 2720000010 HC SURG SUPPLY STERILE: Performed by: SURGERY

## 2023-06-15 PROCEDURE — 3600000004 HC SURGERY LEVEL 4 BASE: Performed by: SURGERY

## 2023-06-15 PROCEDURE — 81025 URINE PREGNANCY TEST: CPT

## 2023-06-15 PROCEDURE — 6360000002 HC RX W HCPCS: Performed by: ANESTHESIOLOGY

## 2023-06-15 PROCEDURE — 3700000000 HC ANESTHESIA ATTENDED CARE: Performed by: SURGERY

## 2023-06-15 PROCEDURE — 2500000003 HC RX 250 WO HCPCS: Performed by: SURGERY

## 2023-06-15 PROCEDURE — C1789 PROSTHESIS, BREAST, IMP: HCPCS | Performed by: SURGERY

## 2023-06-15 DEVICE — IMPLANTABLE DEVICE: Type: IMPLANTABLE DEVICE | Site: BREAST | Status: FUNCTIONAL

## 2023-06-15 RX ORDER — DIPHENHYDRAMINE HYDROCHLORIDE 50 MG/ML
12.5 INJECTION INTRAMUSCULAR; INTRAVENOUS
Status: DISCONTINUED | OUTPATIENT
Start: 2023-06-15 | End: 2023-06-15 | Stop reason: HOSPADM

## 2023-06-15 RX ORDER — LEVOFLOXACIN 5 MG/ML
500 INJECTION, SOLUTION INTRAVENOUS ONCE
Status: COMPLETED | OUTPATIENT
Start: 2023-06-15 | End: 2023-06-15

## 2023-06-15 RX ORDER — BUPIVACAINE HYDROCHLORIDE AND EPINEPHRINE 5; .0091 MG/ML; MG/ML
INJECTION, SOLUTION DENTAL; INFILTRATION PRN
Status: DISCONTINUED | OUTPATIENT
Start: 2023-06-15 | End: 2023-06-15 | Stop reason: ALTCHOICE

## 2023-06-15 RX ORDER — SODIUM CHLORIDE, SODIUM LACTATE, POTASSIUM CHLORIDE, CALCIUM CHLORIDE 600; 310; 30; 20 MG/100ML; MG/100ML; MG/100ML; MG/100ML
INJECTION, SOLUTION INTRAVENOUS CONTINUOUS
Status: DISCONTINUED | OUTPATIENT
Start: 2023-06-15 | End: 2023-06-15 | Stop reason: HOSPADM

## 2023-06-15 RX ORDER — LIDOCAINE HYDROCHLORIDE 10 MG/ML
1 INJECTION, SOLUTION EPIDURAL; INFILTRATION; INTRACAUDAL; PERINEURAL
Status: DISCONTINUED | OUTPATIENT
Start: 2023-06-15 | End: 2023-06-15 | Stop reason: HOSPADM

## 2023-06-15 RX ORDER — DULOXETIN HYDROCHLORIDE 20 MG/1
20 CAPSULE, DELAYED RELEASE ORAL DAILY
Status: ON HOLD | COMMUNITY
End: 2023-06-15

## 2023-06-15 RX ORDER — ONDANSETRON 2 MG/ML
4 INJECTION INTRAMUSCULAR; INTRAVENOUS
Status: DISCONTINUED | OUTPATIENT
Start: 2023-06-15 | End: 2023-06-15 | Stop reason: HOSPADM

## 2023-06-15 RX ORDER — MIDAZOLAM HYDROCHLORIDE 2 MG/2ML
2 INJECTION, SOLUTION INTRAMUSCULAR; INTRAVENOUS
Status: DISCONTINUED | OUTPATIENT
Start: 2023-06-15 | End: 2023-06-15 | Stop reason: HOSPADM

## 2023-06-15 RX ORDER — BISACODYL 10 MG
10 SUPPOSITORY, RECTAL RECTAL DAILY
COMMUNITY

## 2023-06-15 RX ORDER — FENTANYL CITRATE 50 UG/ML
100 INJECTION, SOLUTION INTRAMUSCULAR; INTRAVENOUS
Status: DISCONTINUED | OUTPATIENT
Start: 2023-06-15 | End: 2023-06-15 | Stop reason: HOSPADM

## 2023-06-15 RX ORDER — CLINDAMYCIN HYDROCHLORIDE 300 MG/1
300 CAPSULE ORAL 4 TIMES DAILY
COMMUNITY

## 2023-06-15 RX ADMIN — SODIUM CHLORIDE, POTASSIUM CHLORIDE, SODIUM LACTATE AND CALCIUM CHLORIDE: 600; 310; 30; 20 INJECTION, SOLUTION INTRAVENOUS at 08:48

## 2023-06-15 RX ADMIN — HYDROMORPHONE HYDROCHLORIDE 0.5 MG: 1 INJECTION, SOLUTION INTRAMUSCULAR; INTRAVENOUS; SUBCUTANEOUS at 13:08

## 2023-06-15 RX ADMIN — HYDROMORPHONE HYDROCHLORIDE 0.5 MG: 1 INJECTION, SOLUTION INTRAMUSCULAR; INTRAVENOUS; SUBCUTANEOUS at 13:23

## 2023-06-15 ASSESSMENT — PAIN DESCRIPTION - LOCATION
LOCATION: ABDOMEN

## 2023-06-15 ASSESSMENT — PAIN SCALES - GENERAL
PAINLEVEL_OUTOF10: 4
PAINLEVEL_OUTOF10: 7
PAINLEVEL_OUTOF10: 3
PAINLEVEL_OUTOF10: 7

## 2023-06-15 ASSESSMENT — PAIN DESCRIPTION - DESCRIPTORS
DESCRIPTORS: BURNING

## 2023-06-15 ASSESSMENT — PAIN - FUNCTIONAL ASSESSMENT: PAIN_FUNCTIONAL_ASSESSMENT: NONE - DENIES PAIN

## 2023-06-15 NOTE — H&P
Plastic Surgery PRE-OP Admission History and Physical    Patient: Izabela Lassiter MRN: [id number]  SSN: [id number]    YOB: 1970  Age: 50 y.o. Sex: female            Subjective:   Patient is a 50 y.o. female who presents for previously scheduled surgical intervention of revision bilateral breast reconstruction with implant exchange, ATFTL. The patient was evaluated and determined the most appropriate plan of care is to proceed with surgical intervention. Patient denies chest pain, tightness, pain radiating down left arm, palpitations, dizziness, lightheadedness, fevers, chills. Patient denies shortness of breath, wheezing, diarrhea, constipation, abdominal pain. Patient denies urinary problems, dysuria, hesitancy, urgency. Denies skin breakdown, rashes, insect bites or open area. 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     Past Medical History:   Diagnosis Date    Anxiety     Elevated cholesterol     Wilfredo's syndrome     Hypothyroid     Migraine     Psychiatric disorder     depression, anxiety    Sleep apnea     no cpap at this time once seroquel stopped    Ulcer of gastric fundus     healed now      Past Surgical History:   Procedure Laterality Date    CHOLECYSTECTOMY      COLONOSCOPY      MASTECTOMY, BILATERAL Bilateral 2007    PREVENTATIVE DUE TO FAM HX    ORTHOPEDIC SURGERY  2021    Right Shoulder RCR    THYROIDECTOMY  1986    UPPER GASTROINTESTINAL ENDOSCOPY Bilateral       Prior to Admission medications    Medication Sig Start Date End Date Taking?  Authorizing Provider   clindamycin (CLEOCIN) 300 MG capsule Take 1 capsule by mouth 4 times daily   Yes Historical Provider, MD   bisacodyl (DULCOLAX) 10 MG suppository Place 1 suppository rectally daily   Yes Historical Provider, MD   phentermine 15 MG capsule TAKE ONE

## 2023-06-15 NOTE — BRIEF OP NOTE
Brief Postoperative Note      Patient: Renuka Esquivel  YOB: 1970  MRN: [id number]    Date of Procedure: 6/15/2023    Pre-Op Diagnosis Codes:     * Malignant neoplasm of right female breast, unspecified estrogen receptor status, unspecified site of breast ([facility] [address] [C50.911]     * Malignant neoplasm of left female breast, unspecified estrogen receptor status, unspecified site of breast ([facility] [address] [C50.912]    Post-Op Diagnosis: Post-Op Diagnosis Codes:     * Malignant neoplasm of right female breast, unspecified estrogen receptor status, unspecified site of breast ([facility] [address] [C50.911]     * Malignant neoplasm of left female breast, unspecified estrogen receptor status, unspecified site of breast ([facility] [address] [C50.912]       Procedure(s):  REVISION BILATERAL BREAST RECONSTRUCTION WITH IMPLANT EXCHANGE, POCKET REVISION, AUTOLOGUS TISSUE FAT TRANSFER BILATERAL BREAST    Surgeon(s):  Sharon Mireles MD    Assistant:  Physician Assistant: Ryan Pardo PA-C    Anesthesia: General    Estimated Blood Loss (mL): less than 972     Complications: None    Specimens:   ID Type Source Tests Collected by Time Destination   A : excised left breast skin Tissue Breast SURGICAL PATHOLOGY Sharon Mireles MD 6/15/2023 1158    B : excised right breast skin Tissue Breast SURGICAL PATHOLOGY Sharon Mireles MD 6/15/2023 1202        Implants:  Implant Name Type Inv.  Item Serial No.  Lot No. LRB No. Used Action   [id number] - [id number] gel breast implant   [id number] [employer] NA Left 1 Implanted   [id number] - [id number] silicone gel breast implant   [id number]  NA Right 1 Implanted         Drains: * No LDAs found *    Findings: none      Electronically signed by Asiya Duncan MD on 6/15/2023 at 12:16 PM

## 2023-06-15 NOTE — DISCHARGE INSTRUCTIONS
Kimberley Parker also learn what to do if you think you may have been exposed to the virus. U.S. Centers for Disease Control and Prevention (CDC): The CDC provides updated news about the disease and travel advice. The website also tells you how to prevent the spread of infection. www.cdc.gov  World Health Organization [facility]): WHO offers information about the virus outbreaks. WHO also has travel advice. www.who.int  Current as of: April 1, 2020               Content Version: 12.4  © 2006-2020 Healthwise, Incorporated. Care instructions adapted under license by your healthcare professional. If you have questions about a medical condition or this instruction, always ask your healthcare professional. [address] any warranty or liability for your use of this information. The discharge information has been reviewed with the  caregiver . Any questions and concerns from the  caregiver  have been addressed. The  caregiver  verbalized understanding. CONTENTS FOUND IN YOUR DISCHARGE ENVELOPE:  [x]     Surgeon and Hospital Discharge Instructions  [x]     [facility] Care Provider Card  []     Medication & Side Effect Guide            (your newly prescribed medications have been marked/highlighted showing the most common side effects from   the classes of drugs on your prescriptions)  []     Medication Prescription(s) x *** ( [] These have been sent electronically to your pharmacy by your surgeon,   - OR -       your surgeon has already provided these to you during a previous/pre-op office visit)  []     [address]  []     Physical Therapy Prescription  []     Follow-up Appointment Cards  []     Surgery-related Pictures/Media  []     Pain block and/or block with On-Q Catheter from Anesthesia Service (information included in your instructions above)  []     Medical device information sheets/pamphlets from their    []     School/work excuse note.   []

## 2023-06-16 NOTE — OP NOTE
Marcelo Faustin [facility]  OPERATIVE REPORT    Name:  Soha Dodge  MR#:  [id number]  :  1970  ACCOUNT #:  [de-identified]  DATE OF SERVICE:  06/15/2023    PREOPERATIVE DIAGNOSES:  1. Genetic propensity to breast cancer. 2.  Surgical absence of bilateral breasts. 3.  Breast asymmetry. 4.  Hypertrophic scar, bilateral breasts. POSTOPERATIVE DIAGNOSES:  1. Genetic propensity to breast cancer. 2.  Surgical absence of bilateral breasts. 3.  Breast asymmetry. 4.  Hypertrophic scar, bilateral breasts. PROCEDURES PERFORMED:  1. Reconstruction of right and left breasts with removal of ruptured silicone breast implant and implant mammary material.  2.  Reconstruction of right and left breasts with extensive open capsulotomy and capsulectomy. 3.  Reconstruction of right and left breasts with insertion of silicone breast prosthesis. 4.  Reconstruction of right and left breasts with excision of hypertrophic scar, 10 cm x 3 cm right breast, 10 cm x 3 cm left breast.  5.  Reconstruction of right and left breasts with adjacent tissue transfer, totaling 66 cm.  6.  Reconstruction of right and left breasts with autologous tissue transfer, totaling 400 mL. SURGEON:   Shannon Redmond MD    ASSISTANT:  SONIA Donald. Due to the complexity of this procedure, surgical assistance was required. [facility] assisted with the removal of ruptured silicone implant, implant mammary material, extensive open capsulotomy, capsulectomy, insertion of silicone breast prosthesis, harvesting and placement of autologous fat, adjacent tissue transfer and excision of hypertrophic scar, closure of the wounds and dressing placement. ANESTHESIA:  General endotracheal.    COMPLICATIONS:  None. SPECIMENS REMOVED:  1. Total aspirate liposuction, 1200 mL. 2.  Excised hypertrophic scar, right breast.  3.  Excised hypertrophic scar, left breast.    IMPLANTS:  ***. ESTIMATED BLOOD LOSS:  100 mL.     DRAINS:

## 2023-06-30 ENCOUNTER — APPOINTMENT (OUTPATIENT)
Dept: VASCULAR SURGERY | Facility: HOSPITAL | Age: 53
End: 2023-06-30
Payer: COMMERCIAL

## 2023-06-30 ENCOUNTER — HOSPITAL ENCOUNTER (EMERGENCY)
Facility: HOSPITAL | Age: 53
Discharge: HOME | End: 2023-06-30
Attending: EMERGENCY MEDICINE
Payer: COMMERCIAL

## 2023-06-30 VITALS
SYSTOLIC BLOOD PRESSURE: 132 MMHG | WEIGHT: 249 LBS | HEIGHT: 66 IN | RESPIRATION RATE: 18 BRPM | DIASTOLIC BLOOD PRESSURE: 85 MMHG | HEART RATE: 84 BPM | BODY MASS INDEX: 40.02 KG/M2 | TEMPERATURE: 97.5 F | OXYGEN SATURATION: 99 %

## 2023-06-30 DIAGNOSIS — M79.605 LEFT LEG PAIN: Primary | ICD-10-CM

## 2023-06-30 PROCEDURE — 6370000000 HC RX 637 (ALT 250 FOR IP): Performed by: NURSE PRACTITIONER

## 2023-06-30 PROCEDURE — 93971 EXTREMITY STUDY: CPT

## 2023-06-30 PROCEDURE — 99284 EMERGENCY DEPT VISIT MOD MDM: CPT

## 2023-06-30 RX ORDER — CYCLOBENZAPRINE HCL 10 MG
10 TABLET ORAL ONCE
Status: COMPLETED | OUTPATIENT
Start: 2023-06-30 | End: 2023-06-30

## 2023-06-30 RX ORDER — IBUPROFEN 800 MG/1
800 TABLET ORAL
Status: COMPLETED | OUTPATIENT
Start: 2023-06-30 | End: 2023-06-30

## 2023-06-30 RX ADMIN — CYCLOBENZAPRINE HYDROCHLORIDE 10 MG: 10 TABLET, FILM COATED ORAL at 21:44

## 2023-06-30 RX ADMIN — IBUPROFEN 800 MG: 800 TABLET, FILM COATED ORAL at 21:44

## 2023-06-30 ASSESSMENT — PAIN - FUNCTIONAL ASSESSMENT: PAIN_FUNCTIONAL_ASSESSMENT: NONE - DENIES PAIN

## 2023-07-01 ASSESSMENT — ENCOUNTER SYMPTOMS
SHORTNESS OF BREATH: 0
DIARRHEA: 0
VOMITING: 0
NAUSEA: 0
ABDOMINAL PAIN: 0
RHINORRHEA: 0

## 2023-07-02 LAB — ECHO BSA: 2.29 M2

## 2023-07-06 ENCOUNTER — TELEPHONE (OUTPATIENT)
Facility: CLINIC | Age: 53
End: 2023-07-06

## 2023-07-06 SDOH — HEALTH STABILITY: PHYSICAL HEALTH: ON AVERAGE, HOW MANY DAYS PER WEEK DO YOU ENGAGE IN MODERATE TO STRENUOUS EXERCISE (LIKE A BRISK WALK)?: 0 DAYS

## 2023-07-06 ASSESSMENT — SOCIAL DETERMINANTS OF HEALTH (SDOH)

## 2023-07-06 NOTE — TELEPHONE ENCOUNTER
pt. called and had to leave for another appt pt.   stated she has been waiting since before 10 AM for her appt

## 2023-07-07 ENCOUNTER — TELEPHONE (OUTPATIENT)
Facility: CLINIC | Age: 53
End: 2023-07-07

## 2023-07-07 ENCOUNTER — TELEMEDICINE (OUTPATIENT)
Facility: CLINIC | Age: 53
End: 2023-07-07

## 2023-07-07 DIAGNOSIS — Z00.00 HEALTHCARE MAINTENANCE: ICD-10-CM

## 2023-07-07 DIAGNOSIS — F32.A DEPRESSION, UNSPECIFIED DEPRESSION TYPE: ICD-10-CM

## 2023-07-07 DIAGNOSIS — F41.9 ANXIETY: Primary | ICD-10-CM

## 2023-07-07 DIAGNOSIS — G47.00 INSOMNIA, UNSPECIFIED TYPE: ICD-10-CM

## 2023-07-07 DIAGNOSIS — E78.00 ELEVATED CHOLESTEROL: ICD-10-CM

## 2023-07-07 PROBLEM — E03.9 HYPOTHYROID: Status: ACTIVE | Noted: 2023-07-07

## 2023-07-07 PROBLEM — K25.9 ULCER OF GASTRIC FUNDUS: Status: ACTIVE | Noted: 2023-07-07

## 2023-07-07 PROBLEM — E55.9 VITAMIN D DEFICIENCY: Status: ACTIVE | Noted: 2023-07-07

## 2023-07-07 PROBLEM — G47.30 SLEEP APNEA: Status: ACTIVE | Noted: 2023-07-07

## 2023-07-07 PROBLEM — G43.909 MIGRAINE: Status: ACTIVE | Noted: 2023-07-07

## 2023-07-07 SDOH — HEALTH STABILITY: PHYSICAL HEALTH: ON AVERAGE, HOW MANY DAYS PER WEEK DO YOU ENGAGE IN MODERATE TO STRENUOUS EXERCISE (LIKE A BRISK WALK)?: 0 DAYS

## 2023-07-07 ASSESSMENT — ENCOUNTER SYMPTOMS
ABDOMINAL PAIN: 0
SHORTNESS OF BREATH: 0

## 2023-07-07 NOTE — PROGRESS NOTES
Oskar Gerard (:  1970) is a New patient, presenting virtually for evaluation of the following:    Assessment & Plan   Below is the assessment and plan developed based on review of pertinent history, physical exam, labs, studies, and medications. 1. Anxiety  2. Depression, unspecified depression type  3. Insomnia, unspecified type  4. Healthcare maintenance  -     Comprehensive Metabolic Panel; Future  -     CBC with Auto Differential; Future  5. Elevated cholesterol  -     Comprehensive Metabolic Panel; Future  -     Lipid Panel; Future    Doing fairly well, does not need refills on Xanax yet  Labs per orders. She will call for her next Xanax refill    Return in about 3 months (around 10/7/2023) for Physical.       Subjective   Patient presents with:  New Patient: She needs a physical    She needs to follow up on her depression and anxiety. Currently, she is taking Xanax about one every 2-3 weeks. Review of Systems   Constitutional:  Negative for fatigue. Respiratory:  Negative for shortness of breath. Cardiovascular:  Negative for chest pain. Gastrointestinal:  Negative for abdominal pain. Psychiatric/Behavioral:  Positive for sleep disturbance. Negative for decreased concentration, dysphoric mood and suicidal ideas. The patient is not nervous/anxious. Objective   Patient-Reported Vitals  Patient-Reported Systolic (Top): 950 mmHg  Patient-Reported Diastolic (Bottom): 60 mmHg  Patient-Reported Pulse: 95  Patient-Reported Temperature: 97.3  Patient-Reported Weight: 243  Patient-Reported Height: 5'6.5\"       Physical Exam  Constitutional:       General: She is not in acute distress. Appearance: Normal appearance. She is not ill-appearing. Neurological:      Mental Status: She is alert. Psychiatric:         Mood and Affect: Mood normal.         Behavior: Behavior normal.         Thought Content:  Thought content normal.         Judgment: Judgment normal.              Louie Gift

## 2023-07-08 DIAGNOSIS — E78.00 ELEVATED CHOLESTEROL: ICD-10-CM

## 2023-07-08 DIAGNOSIS — Z00.00 HEALTHCARE MAINTENANCE: ICD-10-CM

## 2023-08-28 ENCOUNTER — OFFICE VISIT (OUTPATIENT)
Age: 53
End: 2023-08-28
Payer: COMMERCIAL

## 2023-08-28 VITALS
DIASTOLIC BLOOD PRESSURE: 85 MMHG | HEIGHT: 67 IN | SYSTOLIC BLOOD PRESSURE: 130 MMHG | BODY MASS INDEX: 38.92 KG/M2 | HEART RATE: 70 BPM | OXYGEN SATURATION: 94 % | RESPIRATION RATE: 16 BRPM | WEIGHT: 248 LBS

## 2023-08-28 DIAGNOSIS — M54.50 CHRONIC BILATERAL LOW BACK PAIN WITHOUT SCIATICA: ICD-10-CM

## 2023-08-28 DIAGNOSIS — E03.9 HYPOTHYROIDISM, UNSPECIFIED TYPE: ICD-10-CM

## 2023-08-28 DIAGNOSIS — E78.5 HYPERLIPIDEMIA, UNSPECIFIED HYPERLIPIDEMIA TYPE: ICD-10-CM

## 2023-08-28 DIAGNOSIS — G47.00 INSOMNIA, UNSPECIFIED TYPE: ICD-10-CM

## 2023-08-28 DIAGNOSIS — F41.9 ANXIETY: ICD-10-CM

## 2023-08-28 DIAGNOSIS — G89.29 CHRONIC BILATERAL LOW BACK PAIN WITHOUT SCIATICA: ICD-10-CM

## 2023-08-28 DIAGNOSIS — E55.9 VITAMIN D DEFICIENCY: ICD-10-CM

## 2023-08-28 DIAGNOSIS — Z00.00 WELL WOMAN EXAM (NO GYNECOLOGICAL EXAM): Primary | ICD-10-CM

## 2023-08-28 DIAGNOSIS — E66.9 CLASS 2 OBESITY WITH BODY MASS INDEX (BMI) OF 38.0 TO 38.9 IN ADULT, UNSPECIFIED OBESITY TYPE, UNSPECIFIED WHETHER SERIOUS COMORBIDITY PRESENT: ICD-10-CM

## 2023-08-28 PROCEDURE — 99214 OFFICE O/P EST MOD 30 MIN: CPT

## 2023-08-28 RX ORDER — PHENTERMINE HYDROCHLORIDE 15 MG/1
15 CAPSULE ORAL EVERY MORNING
Qty: 30 CAPSULE | Refills: 0 | Status: SHIPPED | OUTPATIENT
Start: 2023-08-28 | End: 2023-09-27

## 2023-08-28 RX ORDER — ALPRAZOLAM 0.25 MG/1
0.25 TABLET ORAL PRN
Qty: 30 TABLET | Refills: 0 | Status: SHIPPED | OUTPATIENT
Start: 2023-08-28 | End: 2023-12-31

## 2023-08-28 SDOH — ECONOMIC STABILITY: FOOD INSECURITY: WITHIN THE PAST 12 MONTHS, THE FOOD YOU BOUGHT JUST DIDN'T LAST AND YOU DIDN'T HAVE MONEY TO GET MORE.: NEVER TRUE

## 2023-08-28 SDOH — ECONOMIC STABILITY: FOOD INSECURITY: WITHIN THE PAST 12 MONTHS, YOU WORRIED THAT YOUR FOOD WOULD RUN OUT BEFORE YOU GOT MONEY TO BUY MORE.: NEVER TRUE

## 2023-08-28 SDOH — ECONOMIC STABILITY: HOUSING INSECURITY
IN THE LAST 12 MONTHS, WAS THERE A TIME WHEN YOU DID NOT HAVE A STEADY PLACE TO SLEEP OR SLEPT IN A SHELTER (INCLUDING NOW)?: NO

## 2023-08-28 SDOH — ECONOMIC STABILITY: INCOME INSECURITY: HOW HARD IS IT FOR YOU TO PAY FOR THE VERY BASICS LIKE FOOD, HOUSING, MEDICAL CARE, AND HEATING?: SOMEWHAT HARD

## 2023-08-28 ASSESSMENT — PATIENT HEALTH QUESTIONNAIRE - PHQ9
SUM OF ALL RESPONSES TO PHQ QUESTIONS 1-9: 0
SUM OF ALL RESPONSES TO PHQ QUESTIONS 1-9: 0
SUM OF ALL RESPONSES TO PHQ9 QUESTIONS 1 & 2: 0
2. FEELING DOWN, DEPRESSED OR HOPELESS: 0
1. LITTLE INTEREST OR PLEASURE IN DOING THINGS: 0
SUM OF ALL RESPONSES TO PHQ QUESTIONS 1-9: 0
SUM OF ALL RESPONSES TO PHQ QUESTIONS 1-9: 0

## 2023-08-28 NOTE — PROGRESS NOTES
Chief Complaint   Patient presents with    New Patient     1. Have you been to the ER, urgent care clinic since your last visit? Hospitalized since your last visit? No    2. Have you seen or consulted any other health care providers outside of the [address] [place] [address] Avenue since your last visit? Include any pap smears or colon screening.  Pain MGMT, Endocrinology-Dr Tanna Dodd
I reviewed with the resident the medical history and the resident's findings on the physical examination. I discussed with the resident the patient's diagnosis and concur with the plan. Presents for physical, interested in weight loss. Has previously tried phentermine and done well. Reviewed history and vitals. Start 15mg, follow up 1 month.
weight of 180 to 190 pounds. -     phentermine 15 MG capsule; Take 1 capsule by mouth every morning for 30 days. Take before breakfast or 1 to 2 hours after breakfast. Check blood pressure twice a week at the same time and bring blood pressure log. Max Daily Amount: 15 mg  - Goals of intermittent fasting this month  - Goal of using treadmill for at least 30 minutes 3 days a week  -     7-Drug Screen Unbund, Urine; Future  - PDMP reviewed, controlled substance agreement signed  - Goal: 5% weight loss in 3-6 mo (12.4 lbs). - Smart goal: exercise 3 days/week treadmill. 30 min. - Consult to nutrition    Hypothyroidism, unspecified type: chronic, stable. Follows with [facility]. - Compliant with Synthroid 300 mcg once daily. Hyperlipidemia, unspecified hyperlipidemia type: Previously was on Atorvostatin, stopped due to myalgias in December 2022. -     Lipid Panel; Future  - If elevated, consider Crestor    Insomnia, unspecified type: Does not have difficulty initiating sleep, but does have trouble maintaining sleep. Melatonin does not help with maintaining sleep. Given multiple risk factors for sleep apnea, will rule this out.  - MD Virgilio, Sleep Medicine, [place]  - Patient would benefit from CBT referral.  Will discuss at next visit  -If no improvement, zolpidem could also be of help with sleep maintenance    Vitamin D deficiency: Chronic stable. -Continue supplementation    Anxiety: GLEN-7 is 5 today. Did not tolerate SSRI due to side effect profile. Wellbutrin also tried earlier in her life. Low dose Xanax has been the only thing that works historically for patient in s/o anxiety/panic attacks. Continue low dose PRN as below. -     ALPRAZolam (XANAX) 0.25 MG tablet; Take 1 tablet by mouth as needed for Anxiety (anxiety attack) for up to 125 days. -     7-Drug Screen Unbund, Urine;  Future  -     PDMP reviewed, controlled substance agreement signed  -  Counseled on risk of

## 2023-08-28 NOTE — PATIENT INSTRUCTIONS
The essentials of losing weight and a diabetic lower carbohydrate diet. Exercise  You should exercise 45- 60 minutes every day. This reduces the stored energy in your muscles and allows your insulin level to fall. You can only lose weight when your insulin level is low. Then you burn stored energy. 2.  Fasting   a. You should fast every night from 12-14 hours. b.  Kwan Etienne are still using energy at night when you are sleeping and will burn stored energy. c.  Fasting allows you burn stored energy in your internal organs such as the liver. This allows the insulin level to drop.   d.  This allows you to start losing weight. 3.  No sugar!   a. You should try to eliminate sugar from your diet. b.  First, eliminate sweet drinks including:  sodas and jersey, sports drinks (like Gatorade or Poweraid), sweet tea and lemonade, wine (and beer), fruit juice (contains fruit sugar) and milk (contains milk sugar). c.  Eliminate sugary cereals, cookies and candies. No donuts, pastries or coffee cake. d.  Eat desserts only on special occasions:  family reunions, birthdays, anniversaries, major holidays. Eat only small desserts!   e. Start watching labels for foods that have added sugars. 4. Limit starches   a. Limit starches particularly:  bread, noodles, pasta, crackers and chips, rice, potatoes and fries. b.  Watch out for starchy vegetables:  corn and peas. c.  Women can have 30-45 grams of carbs per meal   d. Men can have 45-60 grams of carbs per meal   e. One piece of bread has 15-20 grams of carbs   f. One half cup of oatmeal, corn, rice, peas and cooked pasta have about 15 grams of carbs. 5.  Fruit-special case   a. Fruit has nutrients we need such as Vitamin C but also contains a lot of fruit sugar (fructose)   b. Fruit is not a good snack because fructose does not suppress your appetite. c.  Fruit can cause progression of fatty liver disease which makes weight loss harder   d.   Limit

## 2023-08-29 LAB
AMPHETAMINES UR QL SCN: NEGATIVE NG/ML
BARBITURATES UR QL SCN: NEGATIVE NG/ML
BENZODIAZ UR QL: NEGATIVE NG/ML
BZE UR QL: NEGATIVE NG/ML
CANNABINOIDS UR QL SCN: NEGATIVE NG/ML
CHOLEST SERPL-MCNC: 338 MG/DL (ref 100–199)
HDLC SERPL-MCNC: 56 MG/DL
IMP & REVIEW OF LAB RESULTS: NORMAL
LABORATORY COMMENT REPORT: ABNORMAL
LDLC SERPL CALC-MCNC: 223 MG/DL (ref 0–99)
OPIATES UR QL: NEGATIVE NG/ML
PCP UR QL: NEGATIVE NG/ML
TRIGL SERPL-MCNC: 286 MG/DL (ref 0–149)
VLDLC SERPL CALC-MCNC: 59 MG/DL (ref 5–40)

## 2023-08-30 DIAGNOSIS — E78.2 MIXED HYPERLIPIDEMIA: Primary | ICD-10-CM

## 2023-08-30 RX ORDER — ROSUVASTATIN CALCIUM 40 MG/1
40 TABLET, COATED ORAL DAILY
Qty: 30 TABLET | Refills: 1 | Status: CANCELLED | OUTPATIENT
Start: 2023-08-30

## 2023-09-05 DIAGNOSIS — E78.5 HYPERLIPIDEMIA, UNSPECIFIED HYPERLIPIDEMIA TYPE: Primary | ICD-10-CM

## 2023-09-05 RX ORDER — ROSUVASTATIN CALCIUM 40 MG/1
40 TABLET, COATED ORAL DAILY
Qty: 90 TABLET | Refills: 1 | Status: SHIPPED | OUTPATIENT
Start: 2023-09-05

## 2023-09-05 NOTE — PROGRESS NOTES
Total cholesterol, LDL both elevated. LDL is 223. Patient amenable to starting highest maximally tolerated statin. Previously did not tolerate Lipitor secondary to myalgias. Will trial Crestor 40 mg once daily. Spoke with patient over the phone and she is agreeable with this plan.

## 2023-09-12 ENCOUNTER — NURSE TRIAGE (OUTPATIENT)
Dept: OTHER | Facility: CLINIC | Age: 53
End: 2023-09-12

## 2023-09-12 NOTE — TELEPHONE ENCOUNTER
[facility] [place]: [facility]    Received call from Josesito King at [facility] with [employer]. Subjective: Caller states \"is on new medication. Having a racing heart\"     Current Symptoms: Feels like an adrenaline rush and heart will pound and race for a couple days. Will wake her at night. Will occur randomly. Right now heart is not racing but feels like can't catch breath. Is on 2 new medications. No illness    Onset: a few days ago; gradual, intermittent    Associated Symptoms: NA    Pain Severity: denies    Temperature: denies     What has been tried:     LMP: NA Pregnant: No    Recommended disposition: See in Office Today    Care advice provided, patient verbalizes understanding; denies any other questions or concerns; instructed to call back for any new or worsening symptoms. Patient/Caller agrees with recommended disposition; writer provided warm transfer to Venuu at [facility] for appointment scheduling    Attention Provider: Thank you for allowing me to participate in the care of your patient. The patient was connected to triage in response to information provided to the ECC/PSC. Please do not respond through this encounter as the response is not directed to a shared pool.     Reason for Disposition   Heart beating very rapidly (e.g., > 140 / minute) and not present now  (Exception: During exercise.)    Protocols used: Heart Rate and Heartbeat Questions-ADULT-[place]

## 2023-09-13 ENCOUNTER — OFFICE VISIT (OUTPATIENT)
Age: 53
End: 2023-09-13
Payer: COMMERCIAL

## 2023-09-13 VITALS
DIASTOLIC BLOOD PRESSURE: 86 MMHG | RESPIRATION RATE: 18 BRPM | SYSTOLIC BLOOD PRESSURE: 123 MMHG | HEART RATE: 84 BPM | BODY MASS INDEX: 38.92 KG/M2 | TEMPERATURE: 98.3 F | HEIGHT: 67 IN | WEIGHT: 248 LBS | OXYGEN SATURATION: 96 %

## 2023-09-13 DIAGNOSIS — R00.2 PALPITATIONS: Primary | ICD-10-CM

## 2023-09-13 PROCEDURE — 93000 ELECTROCARDIOGRAM COMPLETE: CPT | Performed by: STUDENT IN AN ORGANIZED HEALTH CARE EDUCATION/TRAINING PROGRAM

## 2023-09-13 PROCEDURE — 99213 OFFICE O/P EST LOW 20 MIN: CPT | Performed by: STUDENT IN AN ORGANIZED HEALTH CARE EDUCATION/TRAINING PROGRAM

## 2023-09-13 ASSESSMENT — PATIENT HEALTH QUESTIONNAIRE - PHQ9
SUM OF ALL RESPONSES TO PHQ QUESTIONS 1-9: 0
SUM OF ALL RESPONSES TO PHQ QUESTIONS 1-9: 0
1. LITTLE INTEREST OR PLEASURE IN DOING THINGS: 0
SUM OF ALL RESPONSES TO PHQ QUESTIONS 1-9: 0
2. FEELING DOWN, DEPRESSED OR HOPELESS: 0
SUM OF ALL RESPONSES TO PHQ QUESTIONS 1-9: 0
SUM OF ALL RESPONSES TO PHQ9 QUESTIONS 1 & 2: 0

## 2023-09-13 NOTE — PROGRESS NOTES
[address]      Chief Complaint:     Chief Complaint   Patient presents with    Palpitations     Patient is coming in for heart flutters, patient states this started Friday morning and happens in episodes. A couple times per hours, feels like an adrenaline rush. She describes it like the beginning of a panic attack but nothing else happens. Patient started phentermine on 9/3 and a statin on 9/8. Patient states she previously tolerated phentermine well. Georgia Fernandes is a 48 y.o. 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 Briseida Ahuja was seen today for palpitations. Diagnoses and all orders for this visit:    Palpitations: mostly likely 2/2 to recently started phentermine. EKG with NSR. Isolated rSr' pattern in V1 (and V2) may represent incomplete RBBB, no block in lateral leads and QRS duration normal at 88. Small T-wave inversions in lateral leads are c/w prior EKG's. No current chest pain or electrocardiographic evidence of acute ischemia. Will stop phentermine. Given high dose of T4 after surgical removal of thyroid gland will check TSH to ensure appropriate dosing. Also checking Mg, K, and Ca. Correct any abnormality if present. Follow up in 1-2 weeks to see if resolution occurs off stimulant. ER precautions provided. If symptoms persist despite medication discontinuation/further work up required consider Holter monitor and/or stress echo. -     AMB POC EKG ROUTINE  -     Magnesium; Future  -     Basic Metabolic Panel; Future  -     TSH; Future        Follow up:     Return in about 2 weeks (around 9/27/2023) for follow up on palpitations. Subjective:   HPI:  Georgia Fernandes is a 48 y.o. female that presents for:    Palpitations that began about 1 week ago.  Felt some

## 2023-09-14 LAB
BUN SERPL-MCNC: 10 MG/DL (ref 6–24)
BUN/CREAT SERPL: 15 (ref 9–23)
CALCIUM SERPL-MCNC: 8.6 MG/DL (ref 8.7–10.2)
CHLORIDE SERPL-SCNC: 104 MMOL/L (ref 96–106)
CO2 SERPL-SCNC: 24 MMOL/L (ref 20–29)
CREAT SERPL-MCNC: 0.65 MG/DL (ref 0.57–1)
EGFRCR SERPLBLD CKD-EPI 2021: 107 ML/MIN/1.73
GLUCOSE SERPL-MCNC: 112 MG/DL (ref 70–99)
MAGNESIUM SERPL-MCNC: 2.1 MG/DL (ref 1.6–2.3)
POTASSIUM SERPL-SCNC: 4.2 MMOL/L (ref 3.5–5.2)
SODIUM SERPL-SCNC: 144 MMOL/L (ref 134–144)
TSH SERPL DL<=0.005 MIU/L-ACNC: 13.7 UIU/ML (ref 0.45–4.5)

## 2023-09-14 NOTE — RESULT ENCOUNTER NOTE
TSH c/w hypothyroid, elevated at 13.7. Pt states she has been having absorption issues with her Synthroid so advised her to reach out to her endocrinologist as she made need a different administration regimen. Phentermine still remains most likely cause of palpitations.  Mg, K, Ca levels normal.

## 2023-09-29 ENCOUNTER — OFFICE VISIT (OUTPATIENT)
Age: 53
End: 2023-09-29

## 2023-09-29 VITALS
SYSTOLIC BLOOD PRESSURE: 111 MMHG | HEIGHT: 67 IN | HEART RATE: 92 BPM | WEIGHT: 245.6 LBS | DIASTOLIC BLOOD PRESSURE: 76 MMHG | BODY MASS INDEX: 38.55 KG/M2 | TEMPERATURE: 98.5 F | OXYGEN SATURATION: 96 % | RESPIRATION RATE: 14 BRPM

## 2023-09-29 DIAGNOSIS — R35.0 FREQUENCY OF URINATION: ICD-10-CM

## 2023-09-29 DIAGNOSIS — R31.9 HEMATURIA, UNSPECIFIED TYPE: ICD-10-CM

## 2023-09-29 DIAGNOSIS — E66.01 OBESITY, MORBID (HCC): ICD-10-CM

## 2023-09-29 DIAGNOSIS — R30.0 BURNING WITH URINATION: ICD-10-CM

## 2023-09-29 DIAGNOSIS — R00.2 PALPITATIONS: Primary | ICD-10-CM

## 2023-09-29 LAB
BILIRUBIN, URINE, POC: NEGATIVE
BLOOD URINE, POC: NORMAL
GLUCOSE URINE, POC: NEGATIVE
KETONES, URINE, POC: NEGATIVE
LEUKOCYTE ESTERASE, URINE, POC: NEGATIVE
NITRITE, URINE, POC: NEGATIVE
PH, URINE, POC: 5.5 (ref 4.6–8)
PROTEIN,URINE, POC: NEGATIVE
SPECIFIC GRAVITY, URINE, POC: 1.02 (ref 1–1.03)
URINALYSIS CLARITY, POC: CLEAR
URINALYSIS COLOR, POC: YELLOW
UROBILINOGEN, POC: NORMAL

## 2023-09-29 RX ORDER — LIRAGLUTIDE 6 MG/ML
0.6 INJECTION SUBCUTANEOUS DAILY
Qty: 1 ADJUSTABLE DOSE PRE-FILLED PEN SYRINGE | Refills: 1 | Status: SHIPPED | OUTPATIENT
Start: 2023-09-29

## 2023-09-29 RX ORDER — NITROFURANTOIN 25; 75 MG/1; MG/1
100 CAPSULE ORAL 2 TIMES DAILY
Qty: 10 CAPSULE | Refills: 0 | Status: SHIPPED | OUTPATIENT
Start: 2023-09-29 | End: 2023-10-04

## 2023-09-29 RX ORDER — LIRAGLUTIDE 6 MG/ML
0.6 INJECTION SUBCUTANEOUS DAILY
Qty: 1 ADJUSTABLE DOSE PRE-FILLED PEN SYRINGE | Refills: 1 | Status: SHIPPED | OUTPATIENT
Start: 2023-09-29 | End: 2023-09-29

## 2023-09-29 RX ORDER — LIRAGLUTIDE 6 MG/ML
1.2 INJECTION SUBCUTANEOUS DAILY
Qty: 2 ADJUSTABLE DOSE PRE-FILLED PEN SYRINGE | Refills: 0 | Status: SHIPPED | OUTPATIENT
Start: 2023-09-29 | End: 2023-09-29

## 2023-09-29 RX ORDER — LIRAGLUTIDE 6 MG/ML
1.2 INJECTION SUBCUTANEOUS DAILY
Qty: 2 ADJUSTABLE DOSE PRE-FILLED PEN SYRINGE | Refills: 0 | Status: SHIPPED | OUTPATIENT
Start: 2023-09-29

## 2023-10-09 ENCOUNTER — TELEPHONE (OUTPATIENT)
Age: 53
End: 2023-10-09

## 2023-10-11 ENCOUNTER — TELEPHONE (OUTPATIENT)
Age: 53
End: 2023-10-11

## 2023-10-11 DIAGNOSIS — E66.9 CLASS 2 OBESITY WITH BODY MASS INDEX (BMI) OF 38.0 TO 38.9 IN ADULT, UNSPECIFIED OBESITY TYPE, UNSPECIFIED WHETHER SERIOUS COMORBIDITY PRESENT: Primary | ICD-10-CM

## 2023-10-11 NOTE — TELEPHONE ENCOUNTER
[employer] is not covered by pt insurance  Uday Nguyen, [facility] is covered without prior auth    Please advise   Alternative list in your folder for review

## 2023-10-16 ENCOUNTER — TELEPHONE (OUTPATIENT)
Age: 53
End: 2023-10-16

## 2023-11-03 ENCOUNTER — OFFICE VISIT (OUTPATIENT)
Age: 53
End: 2023-11-03

## 2023-11-03 VITALS
HEART RATE: 77 BPM | BODY MASS INDEX: 38.61 KG/M2 | OXYGEN SATURATION: 98 % | SYSTOLIC BLOOD PRESSURE: 123 MMHG | HEIGHT: 67 IN | RESPIRATION RATE: 18 BRPM | WEIGHT: 246 LBS | TEMPERATURE: 98.3 F | DIASTOLIC BLOOD PRESSURE: 83 MMHG

## 2023-11-03 DIAGNOSIS — E03.9 HYPOTHYROIDISM, UNSPECIFIED TYPE: ICD-10-CM

## 2023-11-03 DIAGNOSIS — R73.9 HYPERGLYCEMIA: ICD-10-CM

## 2023-11-03 DIAGNOSIS — E78.5 DYSLIPIDEMIA: ICD-10-CM

## 2023-11-03 DIAGNOSIS — T46.6X5A MYALGIA DUE TO STATIN: Primary | ICD-10-CM

## 2023-11-03 DIAGNOSIS — N30.01 ACUTE CYSTITIS WITH HEMATURIA: ICD-10-CM

## 2023-11-03 DIAGNOSIS — E66.9 CLASS 2 OBESITY WITH BODY MASS INDEX (BMI) OF 38.0 TO 38.9 IN ADULT, UNSPECIFIED OBESITY TYPE, UNSPECIFIED WHETHER SERIOUS COMORBIDITY PRESENT: ICD-10-CM

## 2023-11-03 DIAGNOSIS — R31.9 HEMATURIA, UNSPECIFIED TYPE: ICD-10-CM

## 2023-11-03 DIAGNOSIS — M79.10 MYALGIA DUE TO STATIN: Primary | ICD-10-CM

## 2023-11-03 RX ORDER — PRAVASTATIN SODIUM 80 MG/1
80 TABLET ORAL DAILY
Qty: 90 TABLET | Refills: 1 | Status: SHIPPED | OUTPATIENT
Start: 2023-11-03

## 2023-11-03 ASSESSMENT — PATIENT HEALTH QUESTIONNAIRE - PHQ9
SUM OF ALL RESPONSES TO PHQ QUESTIONS 1-9: 0
1. LITTLE INTEREST OR PLEASURE IN DOING THINGS: 0
SUM OF ALL RESPONSES TO PHQ QUESTIONS 1-9: 0
SUM OF ALL RESPONSES TO PHQ QUESTIONS 1-9: 0
SUM OF ALL RESPONSES TO PHQ9 QUESTIONS 1 & 2: 0
2. FEELING DOWN, DEPRESSED OR HOPELESS: 0
SUM OF ALL RESPONSES TO PHQ QUESTIONS 1-9: 0

## 2023-11-04 LAB
HBA1C MFR BLD: 5.8 % (ref 4.8–5.6)
SPECIMEN STATUS REPORT: NORMAL

## 2023-11-10 LAB
APPEARANCE UR: CLEAR
BACTERIA #/AREA URNS HPF: ABNORMAL /[HPF]
BACTERIA UR CULT: ABNORMAL
BILIRUB UR QL STRIP: NEGATIVE
CASTS URNS QL MICRO: ABNORMAL /LPF
COLOR UR: YELLOW
EPI CELLS #/AREA URNS HPF: >10 /HPF (ref 0–10)
GLUCOSE UR QL STRIP: NEGATIVE
HGB UR QL STRIP: NEGATIVE
KETONES UR QL STRIP: NEGATIVE
LEUKOCYTE ESTERASE UR QL STRIP: NEGATIVE
MICRO URNS: NORMAL
MICRO URNS: NORMAL
NITRITE UR QL STRIP: NEGATIVE
PH UR STRIP: 6 [PH] (ref 5–7.5)
PROT UR QL STRIP: NORMAL
RBC #/AREA URNS HPF: ABNORMAL /HPF (ref 0–2)
SP GR UR STRIP: 1.03 (ref 1–1.03)
URINALYSIS REFLEX: NORMAL
UROBILINOGEN UR STRIP-MCNC: 0.2 MG/DL (ref 0.2–1)
WBC #/AREA URNS HPF: ABNORMAL /HPF (ref 0–5)

## 2023-12-07 ENCOUNTER — OFFICE VISIT (OUTPATIENT)
Age: 53
End: 2023-12-07

## 2023-12-07 VITALS
BODY MASS INDEX: 37.98 KG/M2 | DIASTOLIC BLOOD PRESSURE: 78 MMHG | OXYGEN SATURATION: 97 % | TEMPERATURE: 98.2 F | RESPIRATION RATE: 18 BRPM | SYSTOLIC BLOOD PRESSURE: 112 MMHG | HEART RATE: 71 BPM | HEIGHT: 67 IN | WEIGHT: 242 LBS

## 2023-12-07 DIAGNOSIS — E66.9 CLASS 2 OBESITY WITH BODY MASS INDEX (BMI) OF 38.0 TO 38.9 IN ADULT, UNSPECIFIED OBESITY TYPE, UNSPECIFIED WHETHER SERIOUS COMORBIDITY PRESENT: Primary | ICD-10-CM

## 2023-12-07 DIAGNOSIS — E78.5 DYSLIPIDEMIA: ICD-10-CM

## 2023-12-07 DIAGNOSIS — R73.03 PREDIABETES: ICD-10-CM

## 2023-12-07 DIAGNOSIS — F41.9 ANXIETY: ICD-10-CM

## 2023-12-07 DIAGNOSIS — G47.00 INSOMNIA, UNSPECIFIED TYPE: ICD-10-CM

## 2023-12-07 DIAGNOSIS — R82.71 ASYMPTOMATIC BACTERIURIA: ICD-10-CM

## 2023-12-07 RX ORDER — ALPRAZOLAM 0.25 MG/1
0.25 TABLET ORAL PRN
Qty: 28 TABLET | Refills: 0 | Status: SHIPPED | OUTPATIENT
Start: 2023-12-07 | End: 2024-04-10

## 2023-12-07 ASSESSMENT — PATIENT HEALTH QUESTIONNAIRE - PHQ9
SUM OF ALL RESPONSES TO PHQ QUESTIONS 1-9: 0
2. FEELING DOWN, DEPRESSED OR HOPELESS: 0
SUM OF ALL RESPONSES TO PHQ9 QUESTIONS 1 & 2: 0
SUM OF ALL RESPONSES TO PHQ QUESTIONS 1-9: 0
1. LITTLE INTEREST OR PLEASURE IN DOING THINGS: 0

## 2023-12-07 NOTE — PROGRESS NOTES
[place] [facility], [address] [facility]   Office [phone], Fax [phone]      Chief Complaint:   Lorena Moss is a 48 y.o. female that presents for:     Chief Complaint   Patient presents with    Weight Management     Follow Up on weight loss      Assessment/Plan:   I personally reviewed the following Pertinent Labs/Studies:   - Encounter Notes from my prior clinic notes since Aug 2023    Thomas BOLIVAR was seen today for weight loss. Diagnoses and all orders for this visit:    Dyslipidemia: Compliant with pravastatin 80 mg once daily without side effects. Historically unable to tolerate Crestor or Lipitor with bad side effects of myalgias. Failed multiple trials. Of note, patient does have vitamin D deficiency and hypothyroidism which could have contributed/exacerbated myalgias. -    Cont Pravastatin    Class 2 obesity with body mass index (BMI) of 38.0 to 38.9 in adult, unspecified obesity type, unspecified whether serious comorbidity present: Lost 4 pounds in the past month. Did not tolerate phentermine due to palpitations which was discontinued a few months ago. Patient amenable to trying a GLP-1 for weight loss. She has participated in years of lifestyle modification and exercise with little improvement in weight loss. -     Working on prior authorization for Lacie Snider  -     Goal: 5% weight loss in 3-6 mo (12.4 lbs). -     Smart goal: exercise 2 days/week treadmill. 30 min > 45 minutes daily for new goals month. Can have small snack before exercise with intermittent fasting.  -     Has previously seen nutrition, not interested in this today  - New goal of weight training at her gym. Social aspect will benefit patient. Prediabetes: A1c found to be 5.8.   Discussed that some studies show no clear all cause mortality benefits to starting metformin, although some studies show metformin can lead to weight loss and decreasing risk factors for cardiovascular disease, which may improve

## 2023-12-08 NOTE — PROGRESS NOTES
[address] [address] Medicine Residency Attending Attestation: While the patient was in clinic or immediately following the patient leaving the clinic, I reviewed the patient's medical history, the resident's findings on physical examination, and the patient's diagnosis and treatment plan with the resident and agree with the documentation in the note.      Vania Conway MD

## 2024-01-18 ENCOUNTER — OFFICE VISIT (OUTPATIENT)
Age: 54
End: 2024-01-18

## 2024-01-18 VITALS
DIASTOLIC BLOOD PRESSURE: 72 MMHG | TEMPERATURE: 97.9 F | RESPIRATION RATE: 18 BRPM | HEART RATE: 82 BPM | BODY MASS INDEX: 39.08 KG/M2 | OXYGEN SATURATION: 99 % | SYSTOLIC BLOOD PRESSURE: 143 MMHG | HEIGHT: 67 IN | WEIGHT: 249 LBS

## 2024-01-18 DIAGNOSIS — E66.9 CLASS 2 OBESITY WITH BODY MASS INDEX (BMI) OF 38.0 TO 38.9 IN ADULT, UNSPECIFIED OBESITY TYPE, UNSPECIFIED WHETHER SERIOUS COMORBIDITY PRESENT: Primary | ICD-10-CM

## 2024-01-18 DIAGNOSIS — R21 MALAR RASH: ICD-10-CM

## 2024-01-18 ASSESSMENT — PATIENT HEALTH QUESTIONNAIRE - PHQ9
SUM OF ALL RESPONSES TO PHQ9 QUESTIONS 1 & 2: 0
SUM OF ALL RESPONSES TO PHQ QUESTIONS 1-9: 0
2. FEELING DOWN, DEPRESSED OR HOPELESS: 0
SUM OF ALL RESPONSES TO PHQ QUESTIONS 1-9: 0
1. LITTLE INTEREST OR PLEASURE IN DOING THINGS: 0
SUM OF ALL RESPONSES TO PHQ QUESTIONS 1-9: 0
SUM OF ALL RESPONSES TO PHQ QUESTIONS 1-9: 0

## 2024-01-18 NOTE — PROGRESS NOTES
Pt roomed by first and last name and .    Chief Complaint   Patient presents with    Follow-up Chronic Condition        Vitals:    24 0809   BP: (!) 143/72   Pulse: 82   Resp: 18   Temp: 97.9 °F (36.6 °C)   TempSrc: Temporal   SpO2: 99%   Weight: 112.9 kg (249 lb)   Height: 1.702 m (5' 7\")        1. Have you been to the ER, urgent care clinic since your last visit?  Hospitalized since your last visit? Yes.  Pt 1st for an infection in her umbilicus.    2. Have you seen or consulted any other health care providers outside of the [facility] since your last visit?  Include any pap smears or colon screening. No

## 2024-01-18 NOTE — PROGRESS NOTES
[address] [place] [address]   Office [phone], Fax [phone]      Chief Complaint:   Shelby Tran is a 53 y.o. female that presents for:     Chief Complaint   Patient presents with    Follow-up Chronic Condition     Assessment/Plan:   I personally reviewed the following Pertinent Labs/Studies:   - Encounter Notes from my prior clinic notes since Aug 2023    Shelby BOLIVAR was seen today for weight loss.    Diagnoses and all orders for this visit:    Class 2 obesity with body mass index (BMI) of 38.0 to 38.9 in adult, unspecified obesity type, unspecified whether serious comorbidity present: Weight has been oscillating some.  Did not tolerate phentermine due to palpitations which was discontinued a few months ago.  Patient amenable to trying a GLP-1 for weight loss. She has participated in years of lifestyle modification and exercise with little improvement in weight loss.  -     Working on prior authorization for Mounjaro. Has not been successful thus far.  -     Goal: 5% weight loss in 3-6 mo (12.4 lbs).  -     Smart goal: exercise 2 days/week treadmill. 30 min > 45 minutes daily for new goals month.  Can have small snack before exercise with intermittent fasting.  -     Has previously seen nutrition, not interested in seeing again  - New goal of weight training at her gym.  Social aspect will benefit patient.    Malar Rash: suspect it is likely secondary to recent clindamycin use, which can sometimes make the skin more sensitive to sunlight, wind, cold weather.  She is otherwise asymptomatic, low concern for underlying autoimmune condition flare at this time.  Of note, she does have a history of Hashimoto's thyroiditis.  Rosacea also on the differential, but would expect a prior history.  -Conservative management for now with over-the-counter creams, good hygiene  -Recommended SPF 50 sunscreen if she is outside  -Return if other associated symptoms or if rash worsens    Subjective:

## 2024-04-04 NOTE — PROGRESS NOTES
Spiritual Care Assessment/Progress Note  [address]      NAME: Caprice Floyd      MRN: [id number]  AGE: 41 y.o. SEX: female  Hinduism Affiliation: No preference   Language: English     5/15/2018     Total Time (in minutes): 12     Spiritual Assessment begun in [facility] 6S NEURO-SCI TELE through conversation with:         [x]Patient        [] Family    [] Friend(s)        Reason for Consult: Advance medical directive consult     Spiritual beliefs: (Please include comment if needed)     [x] Identifies with a gary tradition:     [] Supported by a gary community:      [] Claims no spiritual orientation:      [] Seeking spiritual identity:           [] Adheres to an individual form of spirituality:      [] Not able to assess:                     Identified resources for coping:      [x] Prayer                               [] Music                  [] Guided Imagery     [x] Family/friends                 [] Pet visits     [] Devotional reading                         [] Unknown     [] Other:                                            Interventions offered during this visit: (See comments for more details)    Patient Interventions: Advance medical directive consult           Plan of Care:     [x] Support spiritual and/or cultural needs    [] Support AMD and/or advance care planning process      [] Support grieving process   [] Coordinate Rites and/or Rituals    [] Coordination with community clergy   [] No spiritual needs identified at this time   [] Detailed Plan of Care below (See Comments)  [] Make referral to Music Therapy  [] Make referral to Pet Therapy     [] Make referral to Addiction services  [] Make referral to [facility]  [] Make referral to Spiritual Care Partner  [] No future visits requested        [] Follow up visits as needed     Comments: Request by patient, through admission assessment, to assist with advance medical directive. Consulted with patients chart.  Explained document to patient, who was present in the room. Pt would like to review information with her  and complete at a later time.      26[address] Rocco Ocampo M.Div, M.S, [place] 601 available at [address] [place] [address]([phone]) 5 (moderate pain)

## 2024-07-22 ASSESSMENT — SLEEP AND FATIGUE QUESTIONNAIRES
HOW LIKELY ARE YOU TO NOD OFF OR FALL ASLEEP WHILE SITTING AND READING: WOULD NEVER DOZE
HOW LIKELY ARE YOU TO NOD OFF OR FALL ASLEEP WHILE SITTING INACTIVE IN A PUBLIC PLACE: WOULD NEVER DOZE
DO YOU HAVE DIFFICULTY CONCENTRATING ON THE THINGS YOU DO BECAUSE YOU ARE SLEEPY OR TIRED: YES, A LITTLE
HOW LIKELY ARE YOU TO NOD OFF OR FALL ASLEEP WHEN YOU ARE A PASSENGER IN A CAR FOR AN HOUR WITHOUT A BREAK: WOULD NEVER DOZE
HOW LONG DO YOU NAP: OTHER
DO YOU HAVE DIFFICULTY WATCHING A MOVIE OR VIDEO BECAUSE YOU BECOME SLEEPY OR TIRED: YES, A LITTLE
AVERAGE NUMBER OF SLEEP HOURS: 7
DO YOU TAKE NAPS: YES
DO YOU HAVE DIFFICULTY BEING AS ACTIVE AS YOU WANT TO BE IN THE MORNING BECAUSE YOU ARE SLEEPY OR TIRED: NO
DO YOU GENERALLY HAVE DIFFICULTY REMEMBERING THINGS BECAUSE YOU ARE SLEEPY OR TIRED: NO
DO YOU HAVE DIFFICULTY VISITING YOUR FAMILY OR FRIENDS IN THEIR HOME BECAUSE YOU BECOME SLEEPY OR TIRED: NO
DO YOU HAVE PROBLEMS WITH FREQUENT AWAKENINGS AT NIGHT: YES
HOW LIKELY ARE YOU TO NOD OFF OR FALL ASLEEP WHILE LYING DOWN TO REST IN THE AFTERNOON WHEN CIRCUMSTANCES PERMIT: HIGH CHANCE OF DOZING
FOSQ SCORE: 18
HOW MANY NAPS DO YOU TAKE PER WEEK: 3
DO YOU HAVE DIFFICULTY BEING AS ACTIVE AS YOU WANT TO BE IN THE EVENING BECAUSE YOU ARE SLEEPY OR TIRED: YES, LITTLE
DO YOU HAVE DIFFICULTY OPERATING A MOTOR VEHICLE FOR LONG DISTANCES (GREATER THAN 100 MILES) BECAUSE YOU BECOME SLEEPY: NO
DO YOU WORK SHIFTS: NO
HOW LIKELY ARE YOU TO NOD OFF OR FALL ASLEEP WHILE SITTING AND TALKING TO SOMEONE: WOULD NEVER DOZE
DO YOU GET TOO LITTLE SLEEP AT NIGHT: YES
HAS YOUR RELATIONSHIP WITH FAMILY, FRIENDS OR WORK COLLEAGUES BEEN AFFECTED BECAUSE YOU ARE SLEEPY OR TIRED: NO
SELECT ANY OF THE FOLLOWING BEHAVIORS OBSERVED WHILE YOU ARE ASLEEP: LOUD SNORING
HAS YOUR MOOD BEEN AFFECTED BECAUSE YOU ARE SLEEPY OR TIRED: YES, LITTLE
HOW LIKELY ARE YOU TO NOD OFF OR FALL ASLEEP WHILE SITTING QUIETLY AFTER LUNCH WITHOUT ALCOHOL: WOULD NEVER DOZE
HOW LIKELY ARE YOU TO NOD OFF OR FALL ASLEEP IN A CAR, WHILE STOPPED FOR A FEW MINUTES IN TRAFFIC: WOULD NEVER DOZE
NUMBER OF TIMES YOU WAKE PER NIGHT: 3
ARE YOU BOTHERED BY WAKING UP TOO EARLY AND NOT BEING ABLE TO GET BACK TO SLEEP: YES
DO YOU HAVE DIFFICULTY OPERATING A MOTOR VEHICLE FOR SHORT DISTANCES (LESS THAN 100 MILES) BECAUSE YOU BECOME SLEEPY: NO
HOW LIKELY ARE YOU TO NOD OFF OR FALL ASLEEP WHILE WATCHING TV: WOULD NEVER DOZE

## 2024-07-25 ENCOUNTER — OFFICE VISIT (OUTPATIENT)
Age: 54
End: 2024-07-25
Payer: COMMERCIAL

## 2024-07-25 VITALS
WEIGHT: 241 LBS | BODY MASS INDEX: 37.83 KG/M2 | DIASTOLIC BLOOD PRESSURE: 96 MMHG | HEIGHT: 67 IN | OXYGEN SATURATION: 98 % | SYSTOLIC BLOOD PRESSURE: 144 MMHG | HEART RATE: 87 BPM

## 2024-07-25 DIAGNOSIS — G47.33 OSA (OBSTRUCTIVE SLEEP APNEA): Primary | ICD-10-CM

## 2024-07-25 PROCEDURE — 99203 OFFICE O/P NEW LOW 30 MIN: CPT | Performed by: SPECIALIST

## 2024-07-25 NOTE — PROGRESS NOTES
[facility][note 3]2  [facility] SLEEP DISORDERS [facility]  [address] [facility] [id number]  Dept: [phone]           [address]  [place] [address]  Tel.  [phone]  Fax. [phone] [address]  [place] [address]  Tel.  [phone]  Fax. [phone] [address] [place] [address]  Tel.  [phone]  Fax. [phone]       Chief Complaint       Chief Complaint   Patient presents with    Sleep Problem     NP; ref Dr Dakota Paz; frequent awakening at night, day time tiredness, eval for RAVEN       HPI      Shelby Tran is 53 y.o. female seen for evaluation of a sleep disorder.     The patient reports she has experienced nocturnal awakening more frequent to during the past 5 to 6 years.      In general, she retires by 10 PM and is asleep by 11 PM.  Gets out of bed between 6-7 AM.  Describes self as tired on awakening and during the day.  May awaken 2-3 times during the night.  Notes snoring described to her as loud.  Denies sleep talking or sleepwalking, bruxism or nocturnal incontinence, abnormal arm or leg movements, hypnagogic hallucinations, sleep paralysis or cataplexy.    Notes that she may take frequent extended naps without feeling more refreshed on awakening.      Has a history of degenerative lumbar disc disease uses rare oxycodone.      Does not fall asleep and appropriate during the day.  Haskell Sleepiness Scale: 3     The patient has not undergone diagnostic testing for the current problems.             7/22/2024    12:35 PM   Sleep Medicine   Sitting and reading 0   Watching TV 0   Sitting, inactive in a public place (e.g. a theatre or a meeting) 0   As a passenger in a car for an hour without a break 0   Lying down to rest in the afternoon when circumstances permit 3   Sitting and talking to someone 0   Sitting quietly after a lunch without alcohol 0   In a car, while stopped for a few minutes

## 2024-09-03 ENCOUNTER — TELEPHONE (OUTPATIENT)
Age: 54
End: 2024-09-03

## 2024-09-03 NOTE — TELEPHONE ENCOUNTER
----- Message from Petar RUANO sent at 9/3/2024  9:53 AM EDT -----  Regarding: ECC Escalation To Practice  ECC Escalation To Practice      Type of Escalation: Acute Care Symptom  --------------------------------------------------------------------------------------------------------------------------    Information for Provider:  Patient is looking for appointment for: Symptom Leg pain for 6 weeks already  Reasons for Message: Unable to reach practice     Additional Information Leg pain for 6 weeks already  --------------------------------------------------------------------------------------------------------------------------    Relationship to Patient: Self     Call Back Info: OK to leave message on voicemail  Preferred Call Back Number: Phone [phone]   22

## 2024-09-03 NOTE — TELEPHONE ENCOUNTER
Spoke with patient who identified self with two patient identifiers(name and ).    On Release of Information Form? N/A, self    Patient Active Problem List   Diagnosis    Wilfredo syndrome    Dissection of carotid artery (HCC)    Carotid artery dissection (HCC)    Obesity, morbid (HCC)    Anxiety    Elevated cholesterol    Hypothyroid    Migraine    Sleep apnea    Ulcer of gastric fundus    Vitamin D deficiency       Past Surgical History:   Procedure Laterality Date    BREAST SURGERY Bilateral 06/15/2023    REVISION BILATERAL BREAST RECONSTRUCTION WITH IMPLANT EXCHANGE, POCKET REVISION, AUTOLOGUS TISSUE FAT TRANSFER BILATERAL BREAST performed by Berhane Gonzalez MD at [facility] MAIN OR    CHOLECYSTECTOMY      COLONOSCOPY      MASTECTOMY, BILATERAL Bilateral     PREVENTATIVE DUE TO FAM HX    ORTHOPEDIC SURGERY      Right Shoulder RCR    SHOULDER ARTHROSCOPY      THYROIDECTOMY      UPPER GASTROINTESTINAL ENDOSCOPY Bilateral        Allergies:  Allergies   Allergen Reactions    Penicillins Angioedema, Itching and Swelling     20: Swelling, itching (childhood)          Onset: 6 weeks    Chief Complaint/Subjective: Leg Pain  No issues when sitting  When standing up, bilateral legs begin to tighten     Current Symptoms:   Leg Pain  Bilateral  No known Injury      Associated Symptoms:   SOB: No  Chest pain:  No  Racing heart rate or palpitations: No  Headache:  No   Visual changes:  No  Lightheaded: No  Slurred speech:  No  Nausea: No  Vomiting: No    Temperature: NA     Current Pain Severity:     Maximum Pain Severity:     Location: confined to the legs, without radiation  Pain Quality: tightening     Better/Worse:   worsens with: standing  improves with: stretching    What has been tried:   Stretching  Ibuprofen    Recommended disposition: Schedule appointment for evaluation    Future Appointments   Date Time Provider Department Center   2024  9:30 AM [facility]   2024  4:00 PM

## 2024-09-04 ENCOUNTER — PROCEDURE VISIT (OUTPATIENT)
Age: 54
End: 2024-09-04

## 2024-09-04 ENCOUNTER — OFFICE VISIT (OUTPATIENT)
Age: 54
End: 2024-09-04

## 2024-09-04 VITALS
TEMPERATURE: 98.2 F | OXYGEN SATURATION: 94 % | DIASTOLIC BLOOD PRESSURE: 88 MMHG | HEIGHT: 67 IN | SYSTOLIC BLOOD PRESSURE: 132 MMHG | HEART RATE: 76 BPM | RESPIRATION RATE: 18 BRPM | BODY MASS INDEX: 38.14 KG/M2 | WEIGHT: 243 LBS

## 2024-09-04 DIAGNOSIS — M25.551 BILATERAL HIP PAIN: Primary | ICD-10-CM

## 2024-09-04 DIAGNOSIS — M25.552 BILATERAL HIP PAIN: Primary | ICD-10-CM

## 2024-09-04 DIAGNOSIS — G47.33 OSA (OBSTRUCTIVE SLEEP APNEA): Primary | ICD-10-CM

## 2024-09-04 RX ORDER — LIDOCAINE HYDROCHLORIDE 10 MG/ML
3 INJECTION, SOLUTION INFILTRATION; PERINEURAL ONCE
Status: COMPLETED | OUTPATIENT
Start: 2024-09-04 | End: 2024-09-04

## 2024-09-04 RX ORDER — BETAMETHASONE SODIUM PHOSPHATE AND BETAMETHASONE ACETATE 3; 3 MG/ML; MG/ML
12 INJECTION, SUSPENSION INTRA-ARTICULAR; INTRALESIONAL; INTRAMUSCULAR; SOFT TISSUE ONCE
Status: COMPLETED | OUTPATIENT
Start: 2024-09-04 | End: 2024-09-04

## 2024-09-04 RX ORDER — CYCLOBENZAPRINE HCL 10 MG
TABLET ORAL
COMMUNITY

## 2024-09-04 RX ADMIN — LIDOCAINE HYDROCHLORIDE 3 ML: 10 INJECTION, SOLUTION INFILTRATION; PERINEURAL at 17:35

## 2024-09-04 RX ADMIN — BETAMETHASONE SODIUM PHOSPHATE AND BETAMETHASONE ACETATE 12 MG: 3; 3 INJECTION, SUSPENSION INTRA-ARTICULAR; INTRALESIONAL; INTRAMUSCULAR; SOFT TISSUE at 17:32

## 2024-09-04 ASSESSMENT — PATIENT HEALTH QUESTIONNAIRE - PHQ9
SUM OF ALL RESPONSES TO PHQ QUESTIONS 1-9: 0
2. FEELING DOWN, DEPRESSED OR HOPELESS: NOT AT ALL
1. LITTLE INTEREST OR PLEASURE IN DOING THINGS: NOT AT ALL
SUM OF ALL RESPONSES TO PHQ QUESTIONS 1-9: 0
SUM OF ALL RESPONSES TO PHQ9 QUESTIONS 1 & 2: 0

## 2024-09-04 NOTE — PROGRESS NOTES
ROOM# 19    Patient has been identified by name and .    Chief Complaint   Patient presents with    Leg Pain     Pt reports with Bilateral Hip & leg pain, tightness/ ripping pain from hip to knee for about 6 weeks, Pt using rollers, stretching to ease the pain, nothing helped       Vitals:    24 1553 24 1605   BP: (!) 134/90 132/88   Site: Left Upper Arm Right Upper Arm   Position: Sitting Sitting   Cuff Size: Large Adult Medium Adult   Pulse: 84 76   Resp: 18    Temp: 98.2 °F (36.8 °C)    TempSrc: Oral    SpO2: 94%    Weight: 110.2 kg (243 lb)    Height: 1.702 m (5' 7\")         \"Have you been to the ER, urgent care clinic since your last visit?  Hospitalized since your last visit?\"    NO    “Have you seen or consulted any other health care providers outside of [facility] since your last visit?”    NO        “Have you had a pap smear?”    NO    No cervical cancer screening on file

## 2024-09-04 NOTE — PROGRESS NOTES
[address]  [place] [address]  Tel.  [phone]  Fax. [phone] [address]  [place] [address]  Tel.  [phone]  Fax. [phone] [address] [place] [address]  Tel.  [phone]  Fax. [phone]       S>Shelby Tran is a 54 y.o. female seen today to receive a home sleep testing unit (HST).    Patient was educated on proper hookup and operation of the HST.  Instruction forms and documentation were reviewed and signed.  The patient demonstrated good understanding of the HST.    O>    There were no vitals taken for this visit.      A>  No diagnosis found.      P>  General information regarding operations and maintenance of the device was provided.  She was provided information on sleep apnea including coresponding risk factors and the importance of proper treatment.   Follow-up appointment was made to return the HST. She will be contacted once the results have been reviewed.  She was asked to contact our office for any problems regarding her home sleep test study.    WP [id number]       O-T Plasty Text: The defect edges were debeveled with a #15 scalpel blade.  Given the location of the defect, shape of the defect and the proximity to free margins an O-T plasty was deemed most appropriate.  Using a sterile surgical marker, an appropriate O-T plasty was drawn incorporating the defect and placing the expected incisions within the relaxed skin tension lines where possible.    The area thus outlined was incised deep to adipose tissue with a #15 scalpel blade.  The skin margins were undermined to an appropriate distance in all directions utilizing iris scissors.

## 2024-09-04 NOTE — PROGRESS NOTES
[facility]  [facility]   [facility]      Chief Complaint:   Chief Complaint   Patient presents with    Leg Pain     Pt reports with Bilateral Hip & leg pain, tightness/ ripping pain from hip to knee for about 6 weeks, Pt using rollers, stretching to ease the pain, nothing helped       HPI:  Shelby Tran is a 54 y.o. female who presents for bilateral hip pain described as ripping when standing from sitting position which lasts for up to 1 minute and resolves when walking x6-8 weeks. Dull aching when sitting. States working as a  at [employer] which involves standing for prolonged periods of time. States taking Ibuprofen, doing home stretches and using roller. History of degenerative disc disease and disc herniation in back for which she gets radiofrequency nerve ablation and left ablation of left hip, last ablation in June 2024.     PMHx:   Past Medical History:   Diagnosis Date    Anxiety     Depression     Elevated cholesterol     Wilfredo's syndrome     Hypothyroid     Irritable bowel syndrome 1990    Migraine     Obesity 2000    Sleep apnea     no cpap at this time once seroquel stopped    Ulcer of gastric fundus     healed now    Vitamin D deficiency 07/07/2023       Meds:   Current Outpatient Medications   Medication Sig Dispense Refill    cyclobenzaprine (FLEXERIL) 10 MG tablet TAKE ONE TABLET BY MOUTH ONE TIME DAILY AS NEEDED FOR CHRONIC MUSCLE SPASMS      pravastatin (PRAVACHOL) 80 MG tablet Take 1 tablet by mouth daily 90 tablet 1    oxyCODONE (ROXICODONE) 5 MG immediate release tablet TAKE ONE TABLET BY MOUTH FOUR TIMES A DAY AS NEEDED FOR PAIN      levonorgestrel (MIRENA, 52 MG,) IUD 52 mg 1 ea by intrauterine administration once for 1 dose(s)      ergocalciferol (ERGOCALCIFEROL) 1.25 MG (50377 UT) capsule 62608 units orally twice weekly      levothyroxine (SYNTHROID) 300 MCG tablet Take 1 tablet by mouth every morning (before

## 2024-09-05 ENCOUNTER — PROCEDURE VISIT (OUTPATIENT)
Age: 54
End: 2024-09-05

## 2024-09-05 DIAGNOSIS — G47.33 OSA (OBSTRUCTIVE SLEEP APNEA): Primary | ICD-10-CM

## 2024-09-05 NOTE — PROGRESS NOTES
I saw and evaluated the patient, performing the key elements of the service. I discussed the findings, assessment and plan with the resident and agree with the resident's findings and plan as documented in the resident's note.  I was present for the entire procedure.

## 2024-09-05 NOTE — PROGRESS NOTES
Patient returned the WatchPAT home sleep apnea test (HSAT) device intact and without evident damage. Patient will receive the results of the test within 15 business days.

## 2024-09-19 ENCOUNTER — OFFICE VISIT (OUTPATIENT)
Age: 54
End: 2024-09-19

## 2024-09-19 VITALS
RESPIRATION RATE: 18 BRPM | TEMPERATURE: 98.6 F | BODY MASS INDEX: 38.79 KG/M2 | HEIGHT: 67 IN | WEIGHT: 247.14 LBS | OXYGEN SATURATION: 98 % | HEART RATE: 86 BPM | DIASTOLIC BLOOD PRESSURE: 88 MMHG | SYSTOLIC BLOOD PRESSURE: 128 MMHG

## 2024-09-19 DIAGNOSIS — F33.1 MODERATE EPISODE OF RECURRENT MAJOR DEPRESSIVE DISORDER (HCC): Primary | ICD-10-CM

## 2024-09-19 DIAGNOSIS — F51.04 PSYCHOPHYSIOLOGICAL INSOMNIA: ICD-10-CM

## 2024-09-19 DIAGNOSIS — F41.9 ANXIETY: ICD-10-CM

## 2024-09-19 RX ORDER — ALPRAZOLAM 0.25 MG
0.25 TABLET ORAL NIGHTLY PRN
COMMUNITY
Start: 2023-04-20 | End: 2024-09-19 | Stop reason: SDUPTHER

## 2024-09-19 RX ORDER — ESCITALOPRAM OXALATE 10 MG/1
10 TABLET ORAL DAILY
Qty: 30 TABLET | Refills: 3 | Status: SHIPPED | OUTPATIENT
Start: 2024-09-19

## 2024-09-19 RX ORDER — ALPRAZOLAM 0.25 MG
0.25 TABLET ORAL NIGHTLY PRN
Qty: 30 TABLET | Refills: 0 | Status: SHIPPED | OUTPATIENT
Start: 2024-09-19 | End: 2024-10-19

## 2024-09-19 RX ORDER — TRAZODONE HYDROCHLORIDE 50 MG/1
50 TABLET, FILM COATED ORAL NIGHTLY PRN
Qty: 30 TABLET | Refills: 1 | Status: SHIPPED | OUTPATIENT
Start: 2024-09-19

## 2024-09-19 SDOH — ECONOMIC STABILITY: FOOD INSECURITY: WITHIN THE PAST 12 MONTHS, YOU WORRIED THAT YOUR FOOD WOULD RUN OUT BEFORE YOU GOT MONEY TO BUY MORE.: NEVER TRUE

## 2024-09-19 SDOH — ECONOMIC STABILITY: FOOD INSECURITY: WITHIN THE PAST 12 MONTHS, THE FOOD YOU BOUGHT JUST DIDN'T LAST AND YOU DIDN'T HAVE MONEY TO GET MORE.: NEVER TRUE

## 2024-09-19 SDOH — ECONOMIC STABILITY: INCOME INSECURITY: HOW HARD IS IT FOR YOU TO PAY FOR THE VERY BASICS LIKE FOOD, HOUSING, MEDICAL CARE, AND HEATING?: NOT HARD AT ALL

## 2024-09-19 ASSESSMENT — ANXIETY QUESTIONNAIRES
4. TROUBLE RELAXING: MORE THAN HALF THE DAYS
GAD7 TOTAL SCORE: 11
1. FEELING NERVOUS, ANXIOUS, OR ON EDGE: MORE THAN HALF THE DAYS
2. NOT BEING ABLE TO STOP OR CONTROL WORRYING: SEVERAL DAYS
7. FEELING AFRAID AS IF SOMETHING AWFUL MIGHT HAPPEN: SEVERAL DAYS
3. WORRYING TOO MUCH ABOUT DIFFERENT THINGS: SEVERAL DAYS
5. BEING SO RESTLESS THAT IT IS HARD TO SIT STILL: SEVERAL DAYS
6. BECOMING EASILY ANNOYED OR IRRITABLE: NEARLY EVERY DAY
IF YOU CHECKED OFF ANY PROBLEMS ON THIS QUESTIONNAIRE, HOW DIFFICULT HAVE THESE PROBLEMS MADE IT FOR YOU TO DO YOUR WORK, TAKE CARE OF THINGS AT HOME, OR GET ALONG WITH OTHER PEOPLE: SOMEWHAT DIFFICULT

## 2024-09-19 ASSESSMENT — PATIENT HEALTH QUESTIONNAIRE - PHQ9
7. TROUBLE CONCENTRATING ON THINGS, SUCH AS READING THE NEWSPAPER OR WATCHING TELEVISION: SEVERAL DAYS
1. LITTLE INTEREST OR PLEASURE IN DOING THINGS: SEVERAL DAYS
9. THOUGHTS THAT YOU WOULD BE BETTER OFF DEAD, OR OF HURTING YOURSELF: NOT AT ALL
3. TROUBLE FALLING OR STAYING ASLEEP: MORE THAN HALF THE DAYS
2. FEELING DOWN, DEPRESSED OR HOPELESS: SEVERAL DAYS
SUM OF ALL RESPONSES TO PHQ QUESTIONS 1-9: 14
SUM OF ALL RESPONSES TO PHQ9 QUESTIONS 1 & 2: 2
10. IF YOU CHECKED OFF ANY PROBLEMS, HOW DIFFICULT HAVE THESE PROBLEMS MADE IT FOR YOU TO DO YOUR WORK, TAKE CARE OF THINGS AT HOME, OR GET ALONG WITH OTHER PEOPLE: SOMEWHAT DIFFICULT
SUM OF ALL RESPONSES TO PHQ QUESTIONS 1-9: 14
SUM OF ALL RESPONSES TO PHQ QUESTIONS 1-9: 14
6. FEELING BAD ABOUT YOURSELF - OR THAT YOU ARE A FAILURE OR HAVE LET YOURSELF OR YOUR FAMILY DOWN: NEARLY EVERY DAY
5. POOR APPETITE OR OVEREATING: NEARLY EVERY DAY
SUM OF ALL RESPONSES TO PHQ QUESTIONS 1-9: 14
4. FEELING TIRED OR HAVING LITTLE ENERGY: NEARLY EVERY DAY
8. MOVING OR SPEAKING SO SLOWLY THAT OTHER PEOPLE COULD HAVE NOTICED. OR THE OPPOSITE, BEING SO FIGETY OR RESTLESS THAT YOU HAVE BEEN MOVING AROUND A LOT MORE THAN USUAL: NOT AT ALL

## 2024-09-20 LAB
AMPHETAMINES UR QL SCN: NEGATIVE NG/ML
BARBITURATES UR QL SCN: NEGATIVE NG/ML
BENZODIAZ UR QL SCN: NEGATIVE NG/ML
BZE UR QL SCN: NEGATIVE NG/ML
CANNABINOIDS UR QL SCN: NEGATIVE NG/ML
Lab: NORMAL
OPIATES UR QL SCN: NEGATIVE NG/ML
PCP UR QL SCN: NEGATIVE NG/ML

## 2024-09-25 ENCOUNTER — CLINICAL DOCUMENTATION (OUTPATIENT)
Age: 54
End: 2024-09-25

## 2024-09-25 DIAGNOSIS — G47.33 OSA (OBSTRUCTIVE SLEEP APNEA): Primary | ICD-10-CM

## 2024-10-07 ENCOUNTER — OFFICE VISIT (OUTPATIENT)
Age: 54
End: 2024-10-07

## 2024-10-07 VITALS
HEART RATE: 78 BPM | TEMPERATURE: 98.2 F | HEIGHT: 67 IN | RESPIRATION RATE: 18 BRPM | WEIGHT: 247.8 LBS | BODY MASS INDEX: 38.89 KG/M2 | SYSTOLIC BLOOD PRESSURE: 133 MMHG | DIASTOLIC BLOOD PRESSURE: 80 MMHG | OXYGEN SATURATION: 97 %

## 2024-10-07 DIAGNOSIS — R07.89 FEELING OF CHEST TIGHTNESS: ICD-10-CM

## 2024-10-07 DIAGNOSIS — L29.9 ITCHING: Primary | ICD-10-CM

## 2024-10-07 RX ORDER — METHYLPREDNISOLONE 4 MG
TABLET, DOSE PACK ORAL
Qty: 21 KIT | Refills: 0 | Status: SHIPPED | OUTPATIENT
Start: 2024-10-07 | End: 2024-10-13

## 2024-10-07 RX ORDER — LORATADINE 10 MG/1
10 TABLET ORAL DAILY
Qty: 30 TABLET | Refills: 0 | Status: SHIPPED | OUTPATIENT
Start: 2024-10-07 | End: 2024-11-06

## 2024-10-07 ASSESSMENT — PATIENT HEALTH QUESTIONNAIRE - PHQ9
SUM OF ALL RESPONSES TO PHQ QUESTIONS 1-9: 0
1. LITTLE INTEREST OR PLEASURE IN DOING THINGS: NOT AT ALL
SUM OF ALL RESPONSES TO PHQ QUESTIONS 1-9: 0
SUM OF ALL RESPONSES TO PHQ9 QUESTIONS 1 & 2: 0

## 2024-10-07 NOTE — PATIENT INSTRUCTIONS
You can use over the counter hydrocortisone cream that can be applied on any new lesions to decrease itching. Please also obtain some over the counter pepcid for symptoms of GERD. Take the pepcid as instructed on the medication packaging.

## 2024-10-07 NOTE — PROGRESS NOTES
[facility]    Subjective   Shelby Tran is a 54 y.o. female who presents for Urticaria    For the past week the patient has been getting isolated welt/hives and constant itching all over her body. Since Saturday her full body itching has improved but she had a large welt on her left foot this morning that has slightly improved. The hives have been on her right thumb, arms, top of foot, and legs. She states she has not changed her soap, detergent, or any lotions. The only new meds are Lexapro and Trazadone. The itching started about 10 days after starting these new meds (started on 9/19). She has taken benadryl which helped with the itching but new welts/hives would form after the benadryl wore off.     Associated symptoms include swelling and erythema with a radius of around 2-3 inches with irregular borders. The symptoms last for around 2-3 hours and disappear on their own. She had an incidence like this before in 2007. In 2007 she developed hives head to toe that resolved with inpatient steroids for 3 days. She has no family history of swelling and itching like this.     Patient also endorses chest tightness and loose stools for 1 week. No shortness of breath, chest pain, fevers, chills, night sweats, fatigue, nausea, or vomiting. No urinary symptoms. No tingling, numbness, loss of sensation. No history of seasonal or food allergies.     For the last week she has also exhibited more signs of acid reflux. She has been belching more this week which is not normal for her.     Review of Systems   As stated in the above HPI    Medical History  Past Medical History:   Diagnosis Date    Anxiety     Depression     Elevated cholesterol     Wilfredo's syndrome     Hypothyroid     Irritable bowel syndrome 1990    Migraine     Obesity 2000    Sleep apnea     no cpap at this time once seroquel stopped    Ulcer of gastric fundus     healed now    Vitamin D

## 2024-10-07 NOTE — PROGRESS NOTES
Identified pt with two pt identifiers(name and ). Reviewed record in preparation for visit and have obtained necessary documentation.  Chief Complaint   Patient presents with    Urticaria   Pt states she is here for hives that have been on her right thumb, arms, top of foot, and legs.  She states she has not changed her soap, detergent, or any lotions.  She states that the only thing that she has changed is that she was started on Lexapro and Trazodone at the end of Sept.  She states that the hives came 10 days into starting the meds.      Health Maintenance Due   Topic    HIV screen     Hepatitis C screen     Hepatitis B vaccine (1 of 3 - 19+ 3-dose series)    DTaP/Tdap/Td vaccine (1 - Tdap)    Cervical cancer screen     Shingles vaccine (1 of 2)    Flu vaccine (1)    Lipids     COVID-19 Vaccine ( -  season)    A1C test (Diabetic or Prediabetic)        Vitals:    10/07/24 1337   BP: 133/80   Site: Left Upper Arm   Position: Sitting   Cuff Size: Medium Adult   Pulse: 78   Resp: 18   Temp: 98.2 °F (36.8 °C)   TempSrc: Oral   SpO2: 97%   Weight: 112.4 kg (247 lb 12.8 oz)   Height: 1.702 m (5' 7\")         \"Have you been to the ER, urgent care clinic since your last visit?  Hospitalized since your last visit?\"    NO    “Have you seen or consulted any other health care providers outside of [facility] since your last visit?”    NO     “Have you had a pap smear?”    YES - Yes, Dr. [facility] Nurse/CMA to request most recent records if not in the chart    No cervical cancer screening on file             Click Here for Release of Records Request     This patient is accompanied in the office by her self.  I have received verbal consent from Shelby Tran to discuss any/all medical information while they are present in the room.

## 2024-12-10 ENCOUNTER — OFFICE VISIT (OUTPATIENT)
Age: 54
End: 2024-12-10

## 2024-12-10 VITALS
SYSTOLIC BLOOD PRESSURE: 115 MMHG | HEIGHT: 67 IN | DIASTOLIC BLOOD PRESSURE: 77 MMHG | OXYGEN SATURATION: 95 % | TEMPERATURE: 98.5 F | HEART RATE: 87 BPM | WEIGHT: 247 LBS | BODY MASS INDEX: 38.77 KG/M2

## 2024-12-10 DIAGNOSIS — F33.42 RECURRENT MAJOR DEPRESSIVE DISORDER, IN FULL REMISSION (HCC): Primary | ICD-10-CM

## 2024-12-10 DIAGNOSIS — M25.50 GENERALIZED JOINT PAIN: ICD-10-CM

## 2024-12-10 NOTE — PROGRESS NOTES
[address] [place] [address]   Office [phone], Fax [phone]      Chief Complaint:   Shelby Tran is a 54 y.o. female that presents for:     Chief Complaint   Patient presents with    Medication Check     Lexapro.  Since August all day everyday but mostly in the morning and nights has full body joint pain.       Assessment/Plan:     There are no diagnoses linked to this encounter.   Active episode of depression and would like to start back on SSRI. Will try Lexapro given better side effect profile for weight gain. PHQ9 is 14, GLEN 7 is 11 today. Counseled on sleep hygiene which she has been doing a good job with. Wants refill for Xanax and Trazodone prn for now. Will provide 30 days. It took her many months to get through 28 tablets. She is aware not to take Oxycodone (which she uses very sparingly, prescribed by another physician) with the Xanax. She's doing a good job with therapy/counseling.     Follow up:   No follow-ups on file.   Subjective:   HPI:  Shelby Tran is a 54 y.o. female who presents to clinic for evaluation of:    Chronic back pain:  In the setting of degenerative disc disease  Follows with pain specialist (Dr. Ann)  Also does radiofrequency ablations which helps her back pain for roughly 9 months at a time.  Takes oxycodone 5 mg as needed when the ablation treatments are wearing off. Only rarely takes it - only takes it once or twice a year.  This is prescribed by her pain specialist.    MDD/anxiety:  Reports episode of depression over the past 2 weeks  Recent quit job because of pain in R hip, daughter left back for school  Takes Xanax 0.25 mg as needed - only a few times/month for anxiety attacks. Would like refill. Has been taking more frequently.  Had tried Zoloft for 15 years. Patient was taken off of it about 1 year ago and felt great coming off of it. Was having a lot of adverse symptoms including daytime fatigue.  Has tried Wellbutrin before Zoloft.  PHQ9 is

## 2024-12-10 NOTE — PROGRESS NOTES
Shelby Tran is a 54 y.o. female      Chief Complaint   Patient presents with    Medication Check     Lexapro.  Since August all days but mostly in the morning and nights has full body joint pain.         \"Have you been to the ER, urgent care clinic since your last visit?  Hospitalized since your last visit?\"    NO    “Have you seen or consulted any other health care providers outside of [facility] since your last visit?”    NO     “Have you had a pap smear?”    YES - Where: Dr. Jack within 2 years Nurse/CMA to request most recent records if not in the chart    No cervical cancer screening on file             Click Here for Release of Records Request    Vitals:    12/10/24 0953   BP: 115/77   Site: Right Upper Arm   Position: Sitting   Cuff Size: Large Adult   Pulse: 87   Temp: 98.5 °F (36.9 °C)   TempSrc: Oral   SpO2: 95%   Weight: 112 kg (247 lb)   Height: 1.702 m (5' 7.01\")           Medication Reconciliation Completed, changes notes. Please Update medication list.

## 2024-12-11 ASSESSMENT — PATIENT HEALTH QUESTIONNAIRE - PHQ9
8. MOVING OR SPEAKING SO SLOWLY THAT OTHER PEOPLE COULD HAVE NOTICED. OR THE OPPOSITE, BEING SO FIGETY OR RESTLESS THAT YOU HAVE BEEN MOVING AROUND A LOT MORE THAN USUAL: NOT AT ALL
7. TROUBLE CONCENTRATING ON THINGS, SUCH AS READING THE NEWSPAPER OR WATCHING TELEVISION: NOT AT ALL
9. THOUGHTS THAT YOU WOULD BE BETTER OFF DEAD, OR OF HURTING YOURSELF: NOT AT ALL
1. LITTLE INTEREST OR PLEASURE IN DOING THINGS: NOT AT ALL
4. FEELING TIRED OR HAVING LITTLE ENERGY: SEVERAL DAYS
SUM OF ALL RESPONSES TO PHQ QUESTIONS 1-9: 3
SUM OF ALL RESPONSES TO PHQ QUESTIONS 1-9: 3
SUM OF ALL RESPONSES TO PHQ9 QUESTIONS 1 & 2: 0
5. POOR APPETITE OR OVEREATING: NOT AT ALL
6. FEELING BAD ABOUT YOURSELF - OR THAT YOU ARE A FAILURE OR HAVE LET YOURSELF OR YOUR FAMILY DOWN: NOT AT ALL
SUM OF ALL RESPONSES TO PHQ QUESTIONS 1-9: 3
10. IF YOU CHECKED OFF ANY PROBLEMS, HOW DIFFICULT HAVE THESE PROBLEMS MADE IT FOR YOU TO DO YOUR WORK, TAKE CARE OF THINGS AT HOME, OR GET ALONG WITH OTHER PEOPLE: NOT DIFFICULT AT ALL
2. FEELING DOWN, DEPRESSED OR HOPELESS: NOT AT ALL
SUM OF ALL RESPONSES TO PHQ QUESTIONS 1-9: 3
3. TROUBLE FALLING OR STAYING ASLEEP: MORE THAN HALF THE DAYS

## 2024-12-11 ASSESSMENT — ANXIETY QUESTIONNAIRES
1. FEELING NERVOUS, ANXIOUS, OR ON EDGE: NOT AT ALL
4. TROUBLE RELAXING: NOT AT ALL
5. BEING SO RESTLESS THAT IT IS HARD TO SIT STILL: NOT AT ALL
6. BECOMING EASILY ANNOYED OR IRRITABLE: NOT AT ALL
7. FEELING AFRAID AS IF SOMETHING AWFUL MIGHT HAPPEN: NOT AT ALL
IF YOU CHECKED OFF ANY PROBLEMS ON THIS QUESTIONNAIRE, HOW DIFFICULT HAVE THESE PROBLEMS MADE IT FOR YOU TO DO YOUR WORK, TAKE CARE OF THINGS AT HOME, OR GET ALONG WITH OTHER PEOPLE: NOT DIFFICULT AT ALL
3. WORRYING TOO MUCH ABOUT DIFFERENT THINGS: NOT AT ALL
GAD7 TOTAL SCORE: 0
2. NOT BEING ABLE TO STOP OR CONTROL WORRYING: NOT AT ALL

## 2024-12-11 NOTE — PROGRESS NOTES
[address] [place] [address]   Office [phone], Fax [phone]      Chief Complaint:   Shelby Tran is a 54 y.o. female that presents for:     Chief Complaint   Patient presents with    Medication Check     Lexapro.  Since August all day everyday but mostly in the morning and nights has full body joint pain.       Assessment/Plan:Gio BOLIVAR was seen today for medication check.    Diagnoses and all orders for this visit:    Recurrent major depressive disorder, in full remission (HCC): Symptoms have significantly improved on Lexapro. Historically was on SSRI, stopped for a number of years, and restarted on 9/19/24. Started on Lexapro overall with better side effect profile for weight gain. PHQ9 is 14, GLEN 7 is 11 on 9/19/24, now 0 for both. Historically counseled on sleep hygiene which she has been doing a good job with. She's doing a good job with therapy/counseling.     Generalized joint pain: seems present at tendon insertion sites throughout the body -- some fingers, knees, joints in lower extremities, etc. No swelling, no rashes, no fevers. Recently had initial bloodwork done with Dermatology including negative RICHARDSON. Will request records. Both daughters w/ Rebecca's Danlos and patient wonders if she may be in the \"stiff phase.\" She has hx of arthritis which could be contributing.  -Cont regular exercise which she is already doing  -Cont anti-inflammatory diet, which she also is doing a good job with  -Multimodal pain control as needed which was discussed today  -Discussed pain reprocessing therapy and provided resources        Follow up:   Return if symptoms worsen or fail to improve, for annual well visit.   Subjective:   HPI:  Shelby Tran is a 54 y.o. female who presents to clinic for evaluation of:    Generalized joint pains:  Worse in the last few months  FHx of EDS  Has had problems with chronic back pain in the setting of degenerative disc disease  Follows with pain

## 2025-01-17 ENCOUNTER — CLINICAL DOCUMENTATION (OUTPATIENT)
Age: 55
End: 2025-01-17

## 2025-01-17 ENCOUNTER — OFFICE VISIT (OUTPATIENT)
Age: 55
End: 2025-01-17
Payer: COMMERCIAL

## 2025-01-17 VITALS
DIASTOLIC BLOOD PRESSURE: 91 MMHG | HEART RATE: 87 BPM | BODY MASS INDEX: 38.67 KG/M2 | WEIGHT: 246.4 LBS | HEIGHT: 67 IN | SYSTOLIC BLOOD PRESSURE: 129 MMHG | OXYGEN SATURATION: 95 % | TEMPERATURE: 96.1 F

## 2025-01-17 DIAGNOSIS — Z78.9 INTOLERANCE OF CONTINUOUS POSITIVE AIRWAY PRESSURE (CPAP) VENTILATION: ICD-10-CM

## 2025-01-17 DIAGNOSIS — G47.33 OSA (OBSTRUCTIVE SLEEP APNEA): Primary | ICD-10-CM

## 2025-01-17 PROCEDURE — 99213 OFFICE O/P EST LOW 20 MIN: CPT | Performed by: SPECIALIST

## 2025-01-17 ASSESSMENT — SLEEP AND FATIGUE QUESTIONNAIRES
HOW LIKELY ARE YOU TO NOD OFF OR FALL ASLEEP IN A CAR, WHILE STOPPED FOR A FEW MINUTES IN TRAFFIC: WOULD NEVER DOZE
HOW LIKELY ARE YOU TO NOD OFF OR FALL ASLEEP WHILE SITTING INACTIVE IN A PUBLIC PLACE: WOULD NEVER DOZE
HOW LIKELY ARE YOU TO NOD OFF OR FALL ASLEEP WHILE LYING DOWN TO REST IN THE AFTERNOON WHEN CIRCUMSTANCES PERMIT: HIGH CHANCE OF DOZING
HOW LIKELY ARE YOU TO NOD OFF OR FALL ASLEEP WHEN YOU ARE A PASSENGER IN A CAR FOR AN HOUR WITHOUT A BREAK: WOULD NEVER DOZE
HOW LIKELY ARE YOU TO NOD OFF OR FALL ASLEEP WHILE SITTING AND TALKING TO SOMEONE: WOULD NEVER DOZE
HOW LIKELY ARE YOU TO NOD OFF OR FALL ASLEEP WHILE WATCHING TV: WOULD NEVER DOZE
HOW LIKELY ARE YOU TO NOD OFF OR FALL ASLEEP WHILE SITTING AND READING: WOULD NEVER DOZE
ESS TOTAL SCORE: 3
HOW LIKELY ARE YOU TO NOD OFF OR FALL ASLEEP WHILE SITTING QUIETLY AFTER LUNCH WITHOUT ALCOHOL: WOULD NEVER DOZE

## 2025-01-17 NOTE — PROGRESS NOTES
Chief Complaint   Patient presents with    Sleep Problem     1st adh     Patient failed compliance and took device back; would like to discuss other treatment options;  Tried two different masks

## 2025-01-17 NOTE — PROGRESS NOTES
[facility] [address]  [facility] - [place]  [address] [facility] [address]  Dept: [phone]              [address]  [place] [address]  Tel.  [phone]  Fax. [phone] [address]  [place] [address]  Tel.  [phone]  Fax. [phone] [address] [place] [address]  Tel.  [phone]  Fax. [phone]         Chief Complaint       Chief Complaint   Patient presents with    Sleep Problem     1st adh          HPI        Shelby Tran is a 54 y.o. female seen for follow-up. She was evaluated with a home sleep study which demonstrated sleep disordered breathing characterized by AHI 4.2/h (4% desaturation definition). AHI 11.4/h (3% desaturation definition with a REM-related AHI of 17.7/h. Snoring noted. Minimal SaO2 87%.     APAP 5 to 15 cm initiated.  Device set up date: 10/11/2024     Patient was experiencing difficulties acclimating to APAP.  She returned the device.  She is interested in other treatment options.    Allergies   Allergen Reactions    Penicillins Angioedema, Itching and Swelling     5/14/18: Swelling, itching (childhood)      Clindamycin Hcl        No current facility-administered medications for this visit.     She  has a past medical history of Anxiety, Depression, Elevated cholesterol, Wilfredo's syndrome, Hypothyroid, Irritable bowel syndrome, Migraine, Obesity, Sleep apnea, Ulcer of gastric fundus, and Vitamin D deficiency.    She  has a past surgical history that includes Thyroidectomy (1986); Cholecystectomy; orthopedic surgery (2021); Upper gastrointestinal endoscopy (Bilateral); Colonoscopy; Mastectomy, bilateral (Bilateral, 2007); Breast surgery (Bilateral, 06/15/2023); Shoulder arthroscopy; and Cosmetic surgery (2023).    She family history includes Alcohol Abuse in her mother; Breast Cancer in her mother; Early Death in her mother.    She  reports that she quit smoking

## 2025-02-20 ENCOUNTER — OFFICE VISIT (OUTPATIENT)
Age: 55
End: 2025-02-20

## 2025-02-20 VITALS
DIASTOLIC BLOOD PRESSURE: 84 MMHG | HEART RATE: 79 BPM | RESPIRATION RATE: 16 BRPM | OXYGEN SATURATION: 95 % | BODY MASS INDEX: 38.3 KG/M2 | WEIGHT: 244 LBS | SYSTOLIC BLOOD PRESSURE: 128 MMHG | TEMPERATURE: 98 F | HEIGHT: 67 IN

## 2025-02-20 DIAGNOSIS — K64.1 GRADE II HEMORRHOIDS: Primary | ICD-10-CM

## 2025-02-20 RX ORDER — LIDOCAINE HYDROCHLORIDE AND HYDROCORTISONE ACETATE 30; 5 MG/G; MG/G
CREAM RECTAL 2 TIMES DAILY
Qty: 7 G | Refills: 0 | Status: SHIPPED | OUTPATIENT
Start: 2025-02-20 | End: 2025-02-27

## 2025-02-20 ASSESSMENT — ENCOUNTER SYMPTOMS
ANAL BLEEDING: 0
BLOOD IN STOOL: 0

## 2025-02-20 NOTE — PROGRESS NOTES
2025   Shelby Tran (: 1970) is a 54 y.o. female, New patient, here for evaluation of the following chief complaint(s):  Rectal Pain (Pt c/o an irriated \"mass\" near her butthole. Pt states mass will swell than go down. Pt denies bleeding or discharge coming out. Pt states sx have been going on for less than 24hrs. Pt states she did take an epsom salt and helped subsided sx. )       ASSESSMENT/PLAN:  Below is the assessment and plan developed based on review of pertinent history, physical exam, labs, studies, and medications.  1. Grade II hemorrhoids       - Lidocaine- Hydrocortisone   - fiber  - Miralax    Follow up with PCP    Handout given with care instructions  Pt with stable VS, non-toxic appearing  Pt in no acute distress and afebrile   4.   OTC for symptom management. Increase fluid intake, ensure adequate nutritional intake.  5.   Follow up with PCP as needed.  6.   Go to ED with development of any acute symptoms.     Follow up:  No follow-ups on file.  Follow up immediately for any new, worsening or changes or if symptoms are not improving over the next 5-7 days.     SUBJECTIVE/OBJECTIVE:  HPI       Rectal Pain (Pt c/o an irriated \"mass\" near her butthole. Pt states mass will swell than go down. Pt denies bleeding or discharge coming out. Pt states sx have been going on for less than 24hrs. Pt states she did take an epsom salt and helped subsided sx. )  H/o IBS-D      Review of Systems   Gastrointestinal:  Negative for anal bleeding and blood in stool.        Mass \"came out\" of rectum during bowel movement  +pain         Physical Exam  Genitourinary:     Rectum: Tenderness and external hemorrhoid present. No anal fissure or internal hemorrhoid. Normal anal tone.          Comments: No evidence of thrombosis       Vitals:    25 1802   BP: 128/84   Site: Left Upper Arm   Position: Sitting   Cuff Size: Large Adult   Pulse: 79   Resp: 16   Temp: 98 °F (36.7 °C)   TempSrc: Oral   SpO2: 95%

## 2025-04-07 ENCOUNTER — OFFICE VISIT (OUTPATIENT)
Age: 55
End: 2025-04-07

## 2025-04-07 VITALS
SYSTOLIC BLOOD PRESSURE: 143 MMHG | HEIGHT: 67 IN | DIASTOLIC BLOOD PRESSURE: 93 MMHG | TEMPERATURE: 97.8 F | OXYGEN SATURATION: 96 % | RESPIRATION RATE: 18 BRPM | WEIGHT: 257.2 LBS | BODY MASS INDEX: 40.37 KG/M2 | HEART RATE: 83 BPM

## 2025-04-07 DIAGNOSIS — M25.559 GREATER TROCHANTERIC PAIN SYNDROME: Primary | ICD-10-CM

## 2025-04-07 RX ORDER — BETAMETHASONE SODIUM PHOSPHATE AND BETAMETHASONE ACETATE 3; 3 MG/ML; MG/ML
12 INJECTION, SUSPENSION INTRA-ARTICULAR; INTRALESIONAL; INTRAMUSCULAR; SOFT TISSUE ONCE
Status: COMPLETED | OUTPATIENT
Start: 2025-04-07 | End: 2025-04-07

## 2025-04-07 RX ORDER — LIDOCAINE HYDROCHLORIDE 10 MG/ML
3 INJECTION, SOLUTION INFILTRATION; PERINEURAL ONCE
Status: COMPLETED | OUTPATIENT
Start: 2025-04-07 | End: 2025-04-07

## 2025-04-07 RX ORDER — THYROID 60 MG/1
60 TABLET ORAL DAILY
COMMUNITY

## 2025-04-07 RX ORDER — ALPRAZOLAM 0.5 MG
0.25 TABLET ORAL PRN
COMMUNITY
Start: 2025-02-13

## 2025-04-07 RX ADMIN — LIDOCAINE HYDROCHLORIDE 3 ML: 10 INJECTION, SOLUTION INFILTRATION; PERINEURAL at 03:10

## 2025-04-07 RX ADMIN — BETAMETHASONE SODIUM PHOSPHATE AND BETAMETHASONE ACETATE 12 MG: 3; 3 INJECTION, SUSPENSION INTRA-ARTICULAR; INTRALESIONAL; INTRAMUSCULAR; SOFT TISSUE at 03:10

## 2025-04-07 SDOH — ECONOMIC STABILITY: FOOD INSECURITY: WITHIN THE PAST 12 MONTHS, YOU WORRIED THAT YOUR FOOD WOULD RUN OUT BEFORE YOU GOT MONEY TO BUY MORE.: NEVER TRUE

## 2025-04-07 SDOH — ECONOMIC STABILITY: FOOD INSECURITY: WITHIN THE PAST 12 MONTHS, THE FOOD YOU BOUGHT JUST DIDN'T LAST AND YOU DIDN'T HAVE MONEY TO GET MORE.: NEVER TRUE

## 2025-04-07 ASSESSMENT — PATIENT HEALTH QUESTIONNAIRE - PHQ9
1. LITTLE INTEREST OR PLEASURE IN DOING THINGS: NOT AT ALL
2. FEELING DOWN, DEPRESSED OR HOPELESS: NOT AT ALL
SUM OF ALL RESPONSES TO PHQ QUESTIONS 1-9: 0

## 2025-04-07 NOTE — PROGRESS NOTES
Pt here today for LT hip pain that started about 2 wks ago.  Pt is taking Tylenol which gives her some relief but has not been able to able to do any stretches  due to the pain.     Identified pt with two pt identifiers(name and ). Reviewed record in preparation for visit and have obtained necessary documentation.  Chief Complaint   Patient presents with    Hip Pain     LT hip        Vitals:    25 1342 25 1345   BP: (!) 145/92 (!) 143/93   BP Site: Right Upper Arm Left Upper Arm   Patient Position: Sitting Sitting   BP Cuff Size: Large Adult Large Adult   Pulse: 83    Resp: 18    Temp: 97.8 °F (36.6 °C)    TempSrc: Temporal    SpO2: 96%    Weight: 116.7 kg (257 lb 3.2 oz)    Height: 1.702 m (5' 7\")          Coordination of Care Questionnaire:  :     \"Have you been to the ER, urgent care clinic since your last visit?  Hospitalized since your last visit?\"    NO    “Have you seen or consulted any other health care providers outside of [facility] since your last visit?”    Yes hormone therapy     “Have you had a pap smear?”    NO    No cervical cancer screening on file             Click Here for Release of Records Request

## 2025-04-07 NOTE — PROGRESS NOTES
[facility]  [facility]   [facility]      Chief Complaint:   Chief Complaint   Patient presents with    Hip Pain     LT hip       HPI:  Shelby Tran is a 54 y.o. female who presents for L hip pain x2 weeks. Located on lateral side of hip, non radiating. States has taken Tylenol, applied heat which provides reena relief of pain. Denies injury, fall or other particular inciting event. She has done band rolling, hip flexor stretches, and back stretches. Previous injection of R greater trochanter on 9/4/24 provided significant relief.       PMHx:   Past Medical History:   Diagnosis Date    Anxiety     Depression     Elevated cholesterol     Wilfredo's syndrome     Hypothyroid     Irritable bowel syndrome 1990    Migraine     Obesity 2000    Sleep apnea     no cpap at this time once seroquel stopped    Ulcer of gastric fundus     healed now    Vitamin D deficiency 07/07/2023       Meds:   Current Outpatient Medications   Medication Sig Dispense Refill    ALPRAZolam (XANAX) 0.5 MG tablet Take 0.5 tablets by mouth as needed.      thyroid (ARMOUR) 60 MG tablet Take 1 tablet by mouth daily      cyclobenzaprine (FLEXERIL) 10 MG tablet TAKE ONE TABLET BY MOUTH ONE TIME DAILY AS NEEDED FOR CHRONIC MUSCLE SPASMS      pravastatin (PRAVACHOL) 80 MG tablet Take 1 tablet by mouth daily 90 tablet 1    oxyCODONE (ROXICODONE) 5 MG immediate release tablet TAKE ONE TABLET BY MOUTH FOUR TIMES A DAY AS NEEDED FOR PAIN      levonorgestrel (MIRENA, 52 MG,) IUD 52 mg 1 ea by intrauterine administration once for 1 dose(s)      ergocalciferol (ERGOCALCIFEROL) 1.25 MG (62012 UT) capsule Take 1 capsule by mouth once a week      Melatonin 10 MG TABS Take 1 tablet by mouth as needed      escitalopram (LEXAPRO) 10 MG tablet Take 1 tablet by mouth daily (Patient not taking: Reported on 4/7/2025) 30 tablet 3    traZODone (DESYREL) 50 MG tablet Take 1 tablet by mouth nightly as needed

## 2025-04-11 NOTE — PROGRESS NOTES
I saw and evaluated the patient, performing the key elements of the service. I discussed the findings, assessment and plan with the resident and agree with the resident's findings and plan as documented in the resident's note.

## (undated) DEVICE — SOLUTION IRRIG 1000ML 0.9% SOD CHL USP POUR PLAS BTL

## (undated) DEVICE — TUBING SUCT L9FT FOR AUTOFUSE INFLTR SYS

## (undated) DEVICE — BANDAGE COBAN 4 IN COMPR W4INXL5YD FOAM COHESIVE QUIK STK SELF ADH SFT

## (undated) DEVICE — SKN PREP SPNG STKS PVP PNT STR: Brand: MEDLINE INDUSTRIES, INC.

## (undated) DEVICE — SUTURE PLN GUT SZ 5-0 L18IN ABSRB YELLOWISH TAN L13MM PC-1 1915G

## (undated) DEVICE — SYRINGE CATH TIP 50ML

## (undated) DEVICE — 4MM SINGLE USE CANNULA, 10MM PORT, 30CM LONG, FLARED MERCEDES: Brand: MICROAIRE®

## (undated) DEVICE — ASPIRATION TUBING SET, DISPOSABLE: Brand: MICROAIRE®

## (undated) DEVICE — BLADE,CARBON-STEEL,11,STRL,DISPOSABLE,TB: Brand: MEDLINE

## (undated) DEVICE — BINDER ABD M/L H12IN FOR 46-62IN WHT 4 SLD PNL DSGN HOOP

## (undated) DEVICE — SOLUTION IV 1000ML LAC RINGERS PH 6.5 INJ USP VIAFLX PLAS

## (undated) DEVICE — SUTURE MCRYL SZ 3-0 L27IN ABSRB UD L19MM PS-2 3/8 CIR PRIM Y427H

## (undated) DEVICE — 4-PORT MANIFOLD: Brand: NEPTUNE 2

## (undated) DEVICE — CANISTER, RIGID, 3000CC: Brand: MEDLINE INDUSTRIES, INC.

## (undated) DEVICE — PREMIUM WET SKIN PREP TRAY: Brand: MEDLINE INDUSTRIES, INC.

## (undated) DEVICE — GLOVE ORANGE PI 7 1/2   MSG9075

## (undated) DEVICE — DRAPE,CHEST,FENES,15X10,STERIL: Brand: MEDLINE

## (undated) DEVICE — STAPLER SKIN H3.9MM WIRE DIA0.58MM CRWN 6.9MM 35 STPL ROT

## (undated) DEVICE — PLASTICS -SFMC: Brand: MEDLINE INDUSTRIES, INC.

## (undated) DEVICE — SYSTEM IMPL DEL FOR BRST IMPL FUN (SEE COMMENT)

## (undated) DEVICE — TUBING, SUCTION, 1/4" X 10', STRAIGHT: Brand: MEDLINE

## (undated) DEVICE — DRAPE FLD WRM W44XL66IN C6L FOR INTRATEMP SYS THERMABASIN

## (undated) DEVICE — SUTURE PDS II SZ 2-0 L27IN ABSRB VLT SH L26MM 1/2 CIR Z317H

## (undated) DEVICE — SUTURE MCRYL SZ 2-0 L27IN ABSRB UD SH L26MM TAPERPOINT NDL Y417H

## (undated) DEVICE — PACK,ORTOHMAX/CVMAX,UNIVERSAL,5/CS: Brand: MEDLINE

## (undated) DEVICE — BLADE ES L6IN ELASTOMERIC COAT EXT DURABLE BEND UPTO 90DEG

## (undated) DEVICE — TOWEL SURG W17XL27IN STD BLU COT NONFENESTRATED PREWASHED

## (undated) DEVICE — SYRINGE MED 50ML LUERLOCK TIP

## (undated) DEVICE — STRIP,CLOSURE,WOUND,MEDI-STRIP,1/2X4: Brand: MEDLINE

## (undated) DEVICE — SPONGE GZ W4XL4IN COT 12 PLY TYP VII WVN C FLD DSGN STERILE

## (undated) DEVICE — BLANKET WRM W35.4XL86.6IN FULL UNDERBODY + FORC AIR

## (undated) DEVICE — GLOVE SURG SZ 65 THK91MIL LTX FREE SYN POLYISOPRENE

## (undated) DEVICE — SURGICAL BRA: Brand: DEROYAL